# Patient Record
Sex: MALE | Race: OTHER | HISPANIC OR LATINO | URBAN - METROPOLITAN AREA
[De-identification: names, ages, dates, MRNs, and addresses within clinical notes are randomized per-mention and may not be internally consistent; named-entity substitution may affect disease eponyms.]

---

## 2024-07-21 ENCOUNTER — HOSPITAL ENCOUNTER (EMERGENCY)
Facility: HOSPITAL | Age: 29
Discharge: HOME | End: 2024-07-21
Attending: EMERGENCY MEDICINE

## 2024-07-21 VITALS
DIASTOLIC BLOOD PRESSURE: 80 MMHG | WEIGHT: 160 LBS | OXYGEN SATURATION: 97 % | SYSTOLIC BLOOD PRESSURE: 132 MMHG | TEMPERATURE: 98 F | HEIGHT: 69 IN | RESPIRATION RATE: 18 BRPM | BODY MASS INDEX: 23.7 KG/M2 | HEART RATE: 99 BPM

## 2024-07-21 DIAGNOSIS — R10.9 ABDOMINAL PAIN, UNSPECIFIED ABDOMINAL LOCATION: Primary | ICD-10-CM

## 2024-07-21 DIAGNOSIS — K44.9 HIATAL HERNIA: ICD-10-CM

## 2024-07-21 LAB
ALBUMIN SERPL BCP-MCNC: 5.2 G/DL (ref 3.5–5.2)
ALP SERPL-CCNC: 84 U/L (ref 55–135)
ALT SERPL W/O P-5'-P-CCNC: 40 U/L (ref 10–44)
AMPHET+METHAMPHET UR QL: NEGATIVE
ANION GAP SERPL CALC-SCNC: 21 MMOL/L (ref 8–16)
AST SERPL-CCNC: 17 U/L (ref 10–40)
BACTERIA #/AREA URNS HPF: ABNORMAL /HPF
BARBITURATES UR QL SCN>200 NG/ML: NEGATIVE
BASOPHILS # BLD AUTO: 0.03 K/UL (ref 0–0.2)
BASOPHILS # BLD AUTO: 0.04 K/UL (ref 0–0.2)
BASOPHILS NFR BLD: 0.2 % (ref 0–1.9)
BASOPHILS NFR BLD: 0.2 % (ref 0–1.9)
BENZODIAZ UR QL SCN>200 NG/ML: NEGATIVE
BILIRUB SERPL-MCNC: 0.9 MG/DL (ref 0.1–1)
BILIRUB UR QL STRIP: NEGATIVE
BUN SERPL-MCNC: 18 MG/DL (ref 6–20)
BZE UR QL SCN: NEGATIVE
CALCIUM SERPL-MCNC: 10.2 MG/DL (ref 8.7–10.5)
CANNABINOIDS UR QL SCN: NEGATIVE
CHLORIDE SERPL-SCNC: 104 MMOL/L (ref 95–110)
CLARITY UR: CLEAR
CO2 SERPL-SCNC: 16 MMOL/L (ref 23–29)
COLOR UR: YELLOW
CREAT SERPL-MCNC: 1.2 MG/DL (ref 0.5–1.4)
CREAT UR-MCNC: 240.5 MG/DL (ref 23–375)
DIFFERENTIAL METHOD BLD: ABNORMAL
DIFFERENTIAL METHOD BLD: ABNORMAL
EOSINOPHIL # BLD AUTO: 0 K/UL (ref 0–0.5)
EOSINOPHIL # BLD AUTO: 0 K/UL (ref 0–0.5)
EOSINOPHIL NFR BLD: 0 % (ref 0–8)
EOSINOPHIL NFR BLD: 0.1 % (ref 0–8)
ERYTHROCYTE [DISTWIDTH] IN BLOOD BY AUTOMATED COUNT: 11.5 % (ref 11.5–14.5)
ERYTHROCYTE [DISTWIDTH] IN BLOOD BY AUTOMATED COUNT: 11.6 % (ref 11.5–14.5)
EST. GFR  (NO RACE VARIABLE): >60 ML/MIN/1.73 M^2
GLUCOSE SERPL-MCNC: 142 MG/DL (ref 70–110)
GLUCOSE UR QL STRIP: NEGATIVE
HCT VFR BLD AUTO: 47.1 % (ref 40–54)
HCT VFR BLD AUTO: 49.2 % (ref 40–54)
HGB BLD-MCNC: 17 G/DL (ref 14–18)
HGB BLD-MCNC: 18.1 G/DL (ref 14–18)
HGB UR QL STRIP: NEGATIVE
HYALINE CASTS #/AREA URNS LPF: 0 /LPF
IMM GRANULOCYTES # BLD AUTO: 0.06 K/UL (ref 0–0.04)
IMM GRANULOCYTES # BLD AUTO: 0.06 K/UL (ref 0–0.04)
IMM GRANULOCYTES NFR BLD AUTO: 0.3 % (ref 0–0.5)
IMM GRANULOCYTES NFR BLD AUTO: 0.3 % (ref 0–0.5)
INFLUENZA A, MOLECULAR: NEGATIVE
INFLUENZA B, MOLECULAR: NEGATIVE
KETONES UR QL STRIP: ABNORMAL
LACTATE SERPL-SCNC: 1.8 MMOL/L (ref 0.5–2.2)
LACTATE SERPL-SCNC: 2.8 MMOL/L (ref 0.5–2.2)
LEUKOCYTE ESTERASE UR QL STRIP: NEGATIVE
LIPASE SERPL-CCNC: 28 U/L (ref 4–60)
LYMPHOCYTES # BLD AUTO: 0.6 K/UL (ref 1–4.8)
LYMPHOCYTES # BLD AUTO: 0.6 K/UL (ref 1–4.8)
LYMPHOCYTES NFR BLD: 3.5 % (ref 18–48)
LYMPHOCYTES NFR BLD: 3.5 % (ref 18–48)
MCH RBC QN AUTO: 30.9 PG (ref 27–31)
MCH RBC QN AUTO: 31.2 PG (ref 27–31)
MCHC RBC AUTO-ENTMCNC: 36.1 G/DL (ref 32–36)
MCHC RBC AUTO-ENTMCNC: 36.8 G/DL (ref 32–36)
MCV RBC AUTO: 85 FL (ref 82–98)
MCV RBC AUTO: 86 FL (ref 82–98)
METHADONE UR QL SCN>300 NG/ML: NEGATIVE
MICROSCOPIC COMMENT: ABNORMAL
MONOCYTES # BLD AUTO: 0.4 K/UL (ref 0.3–1)
MONOCYTES # BLD AUTO: 0.5 K/UL (ref 0.3–1)
MONOCYTES NFR BLD: 2.1 % (ref 4–15)
MONOCYTES NFR BLD: 2.5 % (ref 4–15)
NEUTROPHILS # BLD AUTO: 16.2 K/UL (ref 1.8–7.7)
NEUTROPHILS # BLD AUTO: 16.6 K/UL (ref 1.8–7.7)
NEUTROPHILS NFR BLD: 93.4 % (ref 38–73)
NEUTROPHILS NFR BLD: 93.9 % (ref 38–73)
NITRITE UR QL STRIP: NEGATIVE
NRBC BLD-RTO: 0 /100 WBC
NRBC BLD-RTO: 0 /100 WBC
OPIATES UR QL SCN: NEGATIVE
PCP UR QL SCN>25 NG/ML: NEGATIVE
PH UR STRIP: 7 [PH] (ref 5–8)
PLATELET # BLD AUTO: 324 K/UL (ref 150–450)
PLATELET # BLD AUTO: 347 K/UL (ref 150–450)
PMV BLD AUTO: 10.2 FL (ref 9.2–12.9)
PMV BLD AUTO: 10.2 FL (ref 9.2–12.9)
POTASSIUM SERPL-SCNC: 3.5 MMOL/L (ref 3.5–5.1)
PROT SERPL-MCNC: 8.5 G/DL (ref 6–8.4)
PROT UR QL STRIP: ABNORMAL
RBC # BLD AUTO: 5.5 M/UL (ref 4.6–6.2)
RBC # BLD AUTO: 5.8 M/UL (ref 4.6–6.2)
RBC #/AREA URNS HPF: 6 /HPF (ref 0–4)
SARS-COV-2 RDRP RESP QL NAA+PROBE: NEGATIVE
SODIUM SERPL-SCNC: 141 MMOL/L (ref 136–145)
SP GR UR STRIP: >1.03 (ref 1–1.03)
SPECIMEN SOURCE: NORMAL
SQUAMOUS #/AREA URNS HPF: 0 /HPF
TOXICOLOGY INFORMATION: NORMAL
URN SPEC COLLECT METH UR: ABNORMAL
UROBILINOGEN UR STRIP-ACNC: NEGATIVE EU/DL
WBC # BLD AUTO: 17.21 K/UL (ref 3.9–12.7)
WBC # BLD AUTO: 17.74 K/UL (ref 3.9–12.7)
WBC #/AREA URNS HPF: 1 /HPF (ref 0–5)

## 2024-07-21 PROCEDURE — 99285 EMERGENCY DEPT VISIT HI MDM: CPT | Mod: 25

## 2024-07-21 PROCEDURE — 87502 INFLUENZA DNA AMP PROBE: CPT | Performed by: EMERGENCY MEDICINE

## 2024-07-21 PROCEDURE — 83605 ASSAY OF LACTIC ACID: CPT | Performed by: EMERGENCY MEDICINE

## 2024-07-21 PROCEDURE — 96375 TX/PRO/DX INJ NEW DRUG ADDON: CPT

## 2024-07-21 PROCEDURE — 80307 DRUG TEST PRSMV CHEM ANLYZR: CPT | Performed by: EMERGENCY MEDICINE

## 2024-07-21 PROCEDURE — U0002 COVID-19 LAB TEST NON-CDC: HCPCS | Performed by: EMERGENCY MEDICINE

## 2024-07-21 PROCEDURE — 85025 COMPLETE CBC W/AUTO DIFF WBC: CPT | Performed by: EMERGENCY MEDICINE

## 2024-07-21 PROCEDURE — 96361 HYDRATE IV INFUSION ADD-ON: CPT

## 2024-07-21 PROCEDURE — 63600175 PHARM REV CODE 636 W HCPCS: Performed by: EMERGENCY MEDICINE

## 2024-07-21 PROCEDURE — 81000 URINALYSIS NONAUTO W/SCOPE: CPT | Mod: 59 | Performed by: EMERGENCY MEDICINE

## 2024-07-21 PROCEDURE — 82962 GLUCOSE BLOOD TEST: CPT

## 2024-07-21 PROCEDURE — 83690 ASSAY OF LIPASE: CPT | Performed by: EMERGENCY MEDICINE

## 2024-07-21 PROCEDURE — 80053 COMPREHEN METABOLIC PANEL: CPT | Performed by: EMERGENCY MEDICINE

## 2024-07-21 PROCEDURE — 25500020 PHARM REV CODE 255: Performed by: EMERGENCY MEDICINE

## 2024-07-21 PROCEDURE — 96365 THER/PROPH/DIAG IV INF INIT: CPT

## 2024-07-21 PROCEDURE — 25000003 PHARM REV CODE 250: Performed by: EMERGENCY MEDICINE

## 2024-07-21 RX ORDER — DICYCLOMINE HYDROCHLORIDE 20 MG/1
20 TABLET ORAL 2 TIMES DAILY PRN
Qty: 20 TABLET | Refills: 0 | Status: SHIPPED | OUTPATIENT
Start: 2024-07-21 | End: 2024-08-20

## 2024-07-21 RX ORDER — ONDANSETRON 4 MG/1
4 TABLET, ORALLY DISINTEGRATING ORAL EVERY 8 HOURS PRN
Qty: 12 TABLET | Refills: 0 | Status: SHIPPED | OUTPATIENT
Start: 2024-07-21 | End: 2024-07-24

## 2024-07-21 RX ORDER — ONDANSETRON HYDROCHLORIDE 2 MG/ML
4 INJECTION, SOLUTION INTRAVENOUS
Status: COMPLETED | OUTPATIENT
Start: 2024-07-21 | End: 2024-07-21

## 2024-07-21 RX ORDER — FENTANYL CITRATE 50 UG/ML
50 INJECTION, SOLUTION INTRAMUSCULAR; INTRAVENOUS
Status: COMPLETED | OUTPATIENT
Start: 2024-07-21 | End: 2024-07-21

## 2024-07-21 RX ADMIN — SODIUM CHLORIDE 1000 ML: 0.9 INJECTION, SOLUTION INTRAVENOUS at 04:07

## 2024-07-21 RX ADMIN — IOHEXOL 75 ML: 350 INJECTION, SOLUTION INTRAVENOUS at 07:07

## 2024-07-21 RX ADMIN — FENTANYL CITRATE 50 MCG: 50 INJECTION INTRAMUSCULAR; INTRAVENOUS at 04:07

## 2024-07-21 RX ADMIN — PROMETHAZINE HYDROCHLORIDE 25 MG: 25 INJECTION INTRAMUSCULAR; INTRAVENOUS at 04:07

## 2024-07-21 RX ADMIN — ONDANSETRON 4 MG: 2 INJECTION, SOLUTION INTRAMUSCULAR; INTRAVENOUS at 04:07

## 2024-07-21 NOTE — ED NOTES
Patient sleeping resting comfortably in bed. Breathing unlabored and even. Friend at bedside. Updated on plan of care.

## 2024-07-21 NOTE — ED NOTES
Patient falling back asleep. Breathing unlabored and even. Maintaining oxygen saturations on RA.   Side rails up x 2. Call light within reach. Friend at bedside. Updated on plan of care.

## 2024-07-21 NOTE — ED PROVIDER NOTES
Encounter Date: 7/21/2024       History     Chief Complaint   Patient presents with    Abdominal Pain     28 y.o. male to ED with c.o. severe abdominal pain with associated nausea and vomiting that started 2 days ago with progressively worsening symptoms. Patient endorses chills, and subjective fever at home. Patient endorses blood in the vomit. Patient denies diarrhea. Patient states he has not been able to keep anything down for the last two days including water?     Patient is a 28-year-old male with no significant past medical or surgical history who presents to the ED with complaint of abdominal pain.  Patient reports epigastric right lower quadrant abdominal pain described as stabbing and nonradiating that began approximately 2 days ago has gradually worsened.  Does report nausea with associated nonbilious nonbloody emesis.  He denies any diarrhea fevers chills chest pain or shortness of breath.  Denies any suspicious food intake or recent national travel.  He has not taken anything for his symptoms. He states his last BW as approximately 2 days ago and was unremarkable.       Review of patient's allergies indicates:   Allergen Reactions    Opioids - morphine analogues Palpitations     No past medical history on file.  No past surgical history on file.  No family history on file.     Review of Systems   Constitutional:  Negative for chills, fatigue and fever.   Respiratory:  Negative for cough and shortness of breath.    Gastrointestinal:  Positive for abdominal pain, nausea and vomiting. Negative for blood in stool, constipation and diarrhea.   Neurological:  Negative for dizziness and headaches.   Psychiatric/Behavioral:  Negative for agitation and confusion.        Physical Exam     Initial Vitals [07/21/24 1542]   BP Pulse Resp Temp SpO2   (!) 155/85 81 16 98.3 °F (36.8 °C) 99 %      MAP       --         Physical Exam    Nursing note and vitals reviewed.  Constitutional: He appears well-developed and  well-nourished.   Uncomfortable appearing.   Non toxic.  Not septic appearing.    Eyes: EOM are normal. Pupils are equal, round, and reactive to light.   Neck: Neck supple.   Normal range of motion.  Pulmonary/Chest: Breath sounds normal.   Abdominal: Abdomen is soft. Bowel sounds are normal.   Soft abdomen.  Diffusely tender.  No rebound tenderness.   No guarding.   Normal bowel sounds.    Musculoskeletal:      Cervical back: Normal range of motion and neck supple.     Neurological: He is alert and oriented to person, place, and time. He has normal strength. GCS score is 15. GCS eye subscore is 4. GCS verbal subscore is 5. GCS motor subscore is 6.   Skin: Skin is warm. Capillary refill takes less than 2 seconds.   Psychiatric: He has a normal mood and affect.         ED Course   Procedures  Labs Reviewed   CBC W/ AUTO DIFFERENTIAL - Abnormal       Result Value    WBC 17.74 (*)     RBC 5.80      Hemoglobin 18.1 (*)     Hematocrit 49.2      MCV 85      MCH 31.2 (*)     MCHC 36.8 (*)     RDW 11.5      Platelets 347      MPV 10.2      Immature Granulocytes 0.3      Gran # (ANC) 16.6 (*)     Immature Grans (Abs) 0.06 (*)     Lymph # 0.6 (*)     Mono # 0.5      Eos # 0.0      Baso # 0.04      nRBC 0      Gran % 93.4 (*)     Lymph % 3.5 (*)     Mono % 2.5 (*)     Eosinophil % 0.1      Basophil % 0.2      Differential Method Automated     COMPREHENSIVE METABOLIC PANEL - Abnormal    Sodium 141      Potassium 3.5      Chloride 104      CO2 16 (*)     Glucose 142 (*)     BUN 18      Creatinine 1.2      Calcium 10.2      Total Protein 8.5 (*)     Albumin 5.2      Total Bilirubin 0.9      Alkaline Phosphatase 84      AST 17      ALT 40      eGFR >60      Anion Gap 21 (*)    LACTIC ACID, PLASMA - Abnormal    Lactate (Lactic Acid) 2.8 (*)    CBC W/ AUTO DIFFERENTIAL - Abnormal    WBC 17.21 (*)     RBC 5.50      Hemoglobin 17.0      Hematocrit 47.1      MCV 86      MCH 30.9      MCHC 36.1 (*)     RDW 11.6      Platelets 324       MPV 10.2      Immature Granulocytes 0.3      Gran # (ANC) 16.2 (*)     Immature Grans (Abs) 0.06 (*)     Lymph # 0.6 (*)     Mono # 0.4      Eos # 0.0      Baso # 0.03      nRBC 0      Gran % 93.9 (*)     Lymph % 3.5 (*)     Mono % 2.1 (*)     Eosinophil % 0.0      Basophil % 0.2      Differential Method Automated     URINALYSIS, REFLEX TO URINE CULTURE - Abnormal    Specimen UA Urine, Clean Catch      Color, UA Yellow      Appearance, UA Clear      pH, UA 7.0      Specific Gravity, UA >1.030 (*)     Protein, UA 1+ (*)     Glucose, UA Negative      Ketones, UA 3+ (*)     Bilirubin (UA) Negative      Occult Blood UA Negative      Nitrite, UA Negative      Urobilinogen, UA Negative      Leukocytes, UA Negative      Narrative:     Specimen Source->Urine   URINALYSIS MICROSCOPIC - Abnormal    RBC, UA 6 (*)     WBC, UA 1      Bacteria None      Squam Epithel, UA 0      Hyaline Casts, UA 0      Microscopic Comment SEE COMMENT      Narrative:     Specimen Source->Urine   INFLUENZA A & B BY MOLECULAR    Influenza A, Molecular Negative      Influenza B, Molecular Negative      Flu A & B Source NP     SARS-COV-2 RNA AMPLIFICATION, QUAL    SARS-CoV-2 RNA, Amplification, Qual Negative     LIPASE    Lipase 28     LACTIC ACID, PLASMA    Lactate (Lactic Acid) 1.8     DRUG SCREEN PANEL, URINE EMERGENCY    Benzodiazepines Negative      Methadone metabolites Negative      Cocaine (Metab.) Negative      Opiate Scrn, Ur Negative      Barbiturate Screen, Ur Negative      Amphetamine Screen, Ur Negative      THC Negative      Phencyclidine Negative      Creatinine, Urine 240.5      Toxicology Information SEE COMMENT      Narrative:     Specimen Source->Urine          Imaging Results              CT Abdomen Pelvis With IV Contrast NO Oral Contrast (Final result)  Result time 07/21/24 19:33:36      Final result by Lane Kim MD (07/21/24 19:33:36)                   Impression:      Hiatal hernia.    Otherwise negative CT of the  abdomen and pelvis.      Electronically signed by: Lane Kim  Date:    07/21/2024  Time:    19:33               Narrative:    EXAMINATION:  CT ABDOMEN PELVIS WITH IV CONTRAST    CLINICAL HISTORY:  Abdominal pain, acute, nonlocalized;    TECHNIQUE:  Low dose axial images, sagittal and coronal reformations were obtained from the lung bases to the pubic symphysis following the IV administration of 75 mL of Omnipaque 350 no    COMPARISON:  None    FINDINGS:  Heart: Normal in size. No pericardial effusion.    Lung Bases: Well aerated, without consolidation or pleural fluid.    Liver: Normal in size and attenuation, with no focal hepatic lesions.    Gallbladder: No calcified gallstones.    Bile Ducts: No evidence of dilated ducts.    Pancreas: No mass or peripancreatic fat stranding.    Spleen: Unremarkable.    Adrenals: Unremarkable.    Kidneys/ Ureters: Unremarkable.    Bladder: No evidence of wall thickening.    Reproductive organs: Unremarkable.    GI Tract/Mesentery: Small to moderate hiatal hernia.  No evidence of bowel obstruction or inflammation.  Normal appendix.    Peritoneal Space: No ascites. No free air.    Retroperitoneum: No significant adenopathy.    Abdominal wall: Unremarkable.    Vasculature: No significant atherosclerosis or aneurysm.    Bones: No acute fracture.                                       Medications   sodium chloride 0.9% bolus 1,000 mL 1,000 mL (0 mLs Intravenous Stopped 7/21/24 1737)   ondansetron injection 4 mg (4 mg Intravenous Given 7/21/24 1637)   fentaNYL 50 mcg/mL injection 50 mcg (50 mcg Intravenous Given 7/21/24 1652)   promethazine (PHENERGAN) 25 mg in 0.9% NaCl 50 mL IVPB (0 mg Intravenous Stopped 7/21/24 1711)   iohexoL (OMNIPAQUE 350) injection 75 mL (75 mLs Intravenous Given 7/21/24 1904)     Medical Decision Making  Amount and/or Complexity of Data Reviewed  Labs: ordered. Decision-making details documented in ED Course.  Radiology: ordered. Decision-making details  documented in ED Course.    Risk  Prescription drug management.               ED Course as of 07/21/24 2345   Sun Jul 21, 2024   1706 WBC(!): 17.74 [LC]   1706 Hemoglobin(!): 18.1 [LC]   1706 Hematocrit: 49.2 [LC]   1714 Creatinine: 1.2 [LC]   1714 Calcium: 10.2 [LC]   1714 Sodium: 141 [LC]   1714 Lipase: 28 [LC]   1818 Lactic Acid Level(!): 2.8 [LC]   1818 Lipase: 28 [LC]   1818 Influenza A, Molecular: Negative [LC]   1818 Influenza B, Molecular: Negative [LC]   1818 SARS-CoV-2 RNA, Amplification, Qual: Negative  Pt reports improvement s/p Fentanyl adminstration  [LC]   1951 CT Abdomen Pelvis With IV Contrast NO Oral Contrast  No acute intra-abdominal abnormality per final radiology read.  [LC]   2045 Lactic Acid Level: 1.8 [LC]   2153 Ketones, UA(!): 3+ [LC]   2153 Marijuana (THC) Metabolite: Negative [LC]   2153 Opiates, Urine: Negative [LC]   2153 Cocaine, Urine: Negative [LC]   2153 Methadone metabolites: Negative [LC]   2153 Benzodiazepines: Negative [LC]   2153 RBC, UA(!): 6 [LC]   2311 WBC(!): 17.21 [LC]   2311 Lactic Acid Level: 1.8  Repeat lactic acid WNL.   [LC]   2311 Pt able to tolerate PO without difficulty. He reports his pain abdominal pain has completely resolved.  [LC]   2332 Pt's leukocytosis may be reactive to his pain, but in light of his normal CT A/P with IV  [LC]      ED Course User Index  [LC] Azar Carolina MD                 Medical Decision Making:   Initial Assessment:   See HPI   Clinical Tests:   Lab Tests: Reviewed and Ordered  Radiological Study: Ordered and Reviewed  ED Management:  - VSS; pt afebrile; mild distress initially  - patient administered IV Fentanyl, Zofran, Phenergan with cessation of abdominal pain, nausea and vomiting  - laboratory analysis notable for WBC of 17K; remainder of labs notable for mildly elevated lactic acid (prior to fluid infusion) but otherwise unremarkable lab analysis   - repeat lactic acid improved s/p administration of IVF   - CT A/P with IV  contrast demonstrated a hiatal hernia but otherwise no acute intra-abdominal or intra-pelvic abnormality  - pt observed for a few hours in the ED without return of his abdominal pain, nausea and/or vomiting  - pt able to tolerate PO without difficulty  - no further emergent intervention indicated at this time; pt requesting discharge   - will have pt f/u with general surgery as an OP in light of reported  hiatal hernia on CT scan imaging  - pt comfortable with plan for discharge with rx  for Bentyl and Zofran  - pt stable for discharge from the emergency department  - No further intervention is indicated at this time after having taken into account the patient's history, physical exam findings, and empirical and objective data obtained during the patient's emergency department workup.   - The patient is at low risk for an emergent medical condition at this time, and I am of the belief that that it is safe to discharge the patient from the emergency department.   - The patient is instructed to follow up as outpatient as indicated on the discharge paperwork.    - I have discussed the specifics of the workup with the patient and the patient has verbalized understanding of the details of the workup, the diagnosis, the treatment plan, and the need for outpatient follow-up.    - Although the patient has no emergent etiology today this does not preclude the development of an emergent condition so, in addition, I have advised the patient that they can return to the ED and/or activate EMS at any time with worsening of their symptoms, change of their symptoms, or with any other medical complaint.    - The patient remained comfortable and stable during their visit in the ED.    - Discharge and follow-up instructions discussed with the patient who expressed understanding and willingness to comply with my recommendations.  - Results of all emergency department tests  discussed thoroughly with patient; all patient questions  answered; pt in agreement with plan  - Pt instructed to follow up with PCP in 2-3 days for recheck of today's complaints  - Pt given strict emergency department return precautions for any new or worsening of symptoms  - Pt discharged from the emergency department in stable condition, in no acute distress                Clinical Impression:  Final diagnoses:  [R10.9] Abdominal pain, unspecified abdominal location (Primary)  [K44.9] Hiatal hernia          ED Disposition Condition    Discharge Stable          ED Prescriptions       Medication Sig Dispense Start Date End Date Auth. Provider    ondansetron (ZOFRAN-ODT) 4 MG TbDL Take 1 tablet (4 mg total) by mouth every 8 (eight) hours as needed (nausea). 12 tablet 7/21/2024 7/24/2024 Azar Carolina MD    dicyclomine (BENTYL) 20 mg tablet Take 1 tablet (20 mg total) by mouth 2 (two) times daily as needed (abdominal cramping). 20 tablet 7/21/2024 8/20/2024 Azar Carolina MD          Follow-up Information       Follow up With Specialties Details Why Contact Info    Yuan Ray MD Surgery, General Surgery Schedule an appointment as soon as possible for a visit   [address]  [address]  [facility] [id number] [phone]               Azar Carolina MD  07/21/24 1254

## 2024-07-21 NOTE — ED NOTES
Patient somnolent after fentanyl administration. Patient arouses to gentle stimuli. Oxygen placed on patient. MD notified. No additional orders.

## 2024-07-21 NOTE — ED NOTES
MD notified about oxygen being placed on patient. No additional orders received. Patient resting comfortably in bed. Breathing unlabored and even. No active vomiting. Pain and nausea improved. Skin warm. Covered with light blanket. Not diaphoretic. Side rails up x 2. Call light within reach. Updated on plan of care.

## 2024-07-21 NOTE — ED NOTES
Patient woke up vomiting. MD notified. MD to bedside.   Patient still does not feel urge to void at this time.

## 2024-07-21 NOTE — ED NOTES
RN spoke with patient, reports patient throwing up and not feeling well. MD to come to bedside. Orders received.

## 2024-07-21 NOTE — ED NOTES
Patient vomiting and dry heaving. Emesis color brown. No blood streaks noted. MD notified. Orders received.

## 2024-07-21 NOTE — ED NOTES
utilized for translation. ID [id number].    Patient arrived to ED via private vehicle with complaints of nausea, vomiting x 3 days with diffuse abdominal pain and now seeing red blood in emesis. Reports recent travel from [place]. Denies travel out of the country. Reports subjective fever. Denies HA, cough, chills, nasal congestion, sore throat.   Reports unable to tolerate PO. Active vomiting during assessment. Patient reports 10/10 pain. Skin hot  to touch. Did not take anything for symptoms. Denies sick contact.     APPEARANCE: Alert, oriented, malaise.   CARDIAC: Normal rate and rhythm, no murmur heard.   PERIPHERAL VASCULAR: peripheral pulses present. Normal cap refill. No edema. Warm to touch.    RESPIRATORY:Normal rate and effort, breath sounds clear bilaterally throughout chest. Respirations are equal and unlabored no obvious signs of distress.  GASTRO: soft, diffuse abdominal tenderness. Worse around epigastric and periumbilical. Active vomiting. Unknown last BM. Denies previous abdominal surgeries.   MUSC: Full ROM. No bony tenderness or soft tissue tenderness. No obvious deformity.  SKIN: Skin is hot to touch and dry, normal skin turgor, mucous membranes moist.  NEURO: 5/5 strength major flexors/extensors bilaterally. Sensory intact to light touch bilaterally. [place] coma scale: eyes open spontaneously-4, oriented & converses-5, obeys commands-6. No neurological abnormalities.   MENTAL STATUS: awake, alert and aware of environment.  EYE: PERRL, both eyes: pupils brisk and reactive to light. Normal size.  ENT: EARS: no obvious drainage. NOSE: no active bleeding.

## 2024-07-22 LAB — POCT GLUCOSE: 126 MG/DL (ref 70–110)

## 2024-07-22 NOTE — DISCHARGE INSTRUCTIONS
Christianson tomografía computarizada de christianson abdomen y pelvis no demuestra nada anormal aparte de amish hernia de hiato.    Por favor manténgase bindu hidratado.     Le darán el ilya con analgésicos. Tómelo según sea necesario.     Comuníquese con christianson médico de atención primaria en los próximos 2 o 3 días para volver a verificar las quejas de hoy.

## 2024-08-18 ENCOUNTER — HOSPITAL ENCOUNTER (EMERGENCY)
Facility: HOSPITAL | Age: 29
Discharge: HOME | End: 2024-08-19
Attending: EMERGENCY MEDICINE

## 2024-08-18 DIAGNOSIS — R11.2 NAUSEA AND VOMITING, UNSPECIFIED VOMITING TYPE: ICD-10-CM

## 2024-08-18 DIAGNOSIS — R03.0 ELEVATED BLOOD PRESSURE READING: ICD-10-CM

## 2024-08-18 DIAGNOSIS — K29.70 GASTRITIS, PRESENCE OF BLEEDING UNSPECIFIED, UNSPECIFIED CHRONICITY, UNSPECIFIED GASTRITIS TYPE: Primary | ICD-10-CM

## 2024-08-18 PROCEDURE — 99284 EMERGENCY DEPT VISIT MOD MDM: CPT | Mod: 25

## 2024-08-18 RX ORDER — LIDOCAINE HYDROCHLORIDE 20 MG/ML
15 SOLUTION OROPHARYNGEAL ONCE
Status: COMPLETED | OUTPATIENT
Start: 2024-08-19 | End: 2024-08-19

## 2024-08-18 RX ORDER — FAMOTIDINE 10 MG/ML
20 INJECTION INTRAVENOUS ONCE
Status: DISCONTINUED | OUTPATIENT
Start: 2024-08-19 | End: 2024-08-19 | Stop reason: HOSPADM

## 2024-08-18 RX ORDER — ALUMINUM HYDROXIDE, MAGNESIUM HYDROXIDE, AND SIMETHICONE 1200; 120; 1200 MG/30ML; MG/30ML; MG/30ML
30 SUSPENSION ORAL ONCE
Status: COMPLETED | OUTPATIENT
Start: 2024-08-19 | End: 2024-08-19

## 2024-08-19 VITALS
RESPIRATION RATE: 16 BRPM | OXYGEN SATURATION: 96 % | WEIGHT: 160 LBS | DIASTOLIC BLOOD PRESSURE: 82 MMHG | SYSTOLIC BLOOD PRESSURE: 134 MMHG | BODY MASS INDEX: 24.25 KG/M2 | TEMPERATURE: 98 F | HEIGHT: 68 IN | HEART RATE: 72 BPM

## 2024-08-19 LAB
ALBUMIN SERPL BCP-MCNC: 4.6 G/DL (ref 3.5–5.2)
ALP SERPL-CCNC: 77 U/L (ref 55–135)
ALT SERPL W/O P-5'-P-CCNC: 35 U/L (ref 10–44)
ANION GAP SERPL CALC-SCNC: 15 MMOL/L (ref 8–16)
AST SERPL-CCNC: 15 U/L (ref 10–40)
BACTERIA #/AREA URNS HPF: ABNORMAL /HPF
BASOPHILS # BLD AUTO: 0.03 K/UL (ref 0–0.2)
BASOPHILS NFR BLD: 0.3 % (ref 0–1.9)
BILIRUB SERPL-MCNC: 0.8 MG/DL (ref 0.1–1)
BILIRUB UR QL STRIP: NEGATIVE
BUN SERPL-MCNC: 16 MG/DL (ref 6–20)
CALCIUM SERPL-MCNC: 10 MG/DL (ref 8.7–10.5)
CHLORIDE SERPL-SCNC: 101 MMOL/L (ref 95–110)
CLARITY UR: CLEAR
CO2 SERPL-SCNC: 21 MMOL/L (ref 23–29)
COLOR UR: YELLOW
CREAT SERPL-MCNC: 0.9 MG/DL (ref 0.5–1.4)
DIFFERENTIAL METHOD BLD: ABNORMAL
EOSINOPHIL # BLD AUTO: 0 K/UL (ref 0–0.5)
EOSINOPHIL NFR BLD: 0.1 % (ref 0–8)
ERYTHROCYTE [DISTWIDTH] IN BLOOD BY AUTOMATED COUNT: 11.9 % (ref 11.5–14.5)
EST. GFR  (NO RACE VARIABLE): >60 ML/MIN/1.73 M^2
GLUCOSE SERPL-MCNC: 91 MG/DL (ref 70–110)
GLUCOSE UR QL STRIP: NEGATIVE
HCT VFR BLD AUTO: 47.1 % (ref 40–54)
HGB BLD-MCNC: 16.9 G/DL (ref 14–18)
HGB UR QL STRIP: NEGATIVE
HYALINE CASTS #/AREA URNS LPF: 0 /LPF
IMM GRANULOCYTES # BLD AUTO: 0.01 K/UL (ref 0–0.04)
IMM GRANULOCYTES NFR BLD AUTO: 0.1 % (ref 0–0.5)
KETONES UR QL STRIP: ABNORMAL
LEUKOCYTE ESTERASE UR QL STRIP: NEGATIVE
LIPASE SERPL-CCNC: 16 U/L (ref 4–60)
LYMPHOCYTES # BLD AUTO: 1.5 K/UL (ref 1–4.8)
LYMPHOCYTES NFR BLD: 16.8 % (ref 18–48)
MCH RBC QN AUTO: 31 PG (ref 27–31)
MCHC RBC AUTO-ENTMCNC: 35.9 G/DL (ref 32–36)
MCV RBC AUTO: 86 FL (ref 82–98)
MICROSCOPIC COMMENT: ABNORMAL
MONOCYTES # BLD AUTO: 0.6 K/UL (ref 0.3–1)
MONOCYTES NFR BLD: 6.1 % (ref 4–15)
NEUTROPHILS # BLD AUTO: 6.9 K/UL (ref 1.8–7.7)
NEUTROPHILS NFR BLD: 76.6 % (ref 38–73)
NITRITE UR QL STRIP: NEGATIVE
NRBC BLD-RTO: 0 /100 WBC
PH UR STRIP: 6 [PH] (ref 5–8)
PLATELET # BLD AUTO: 322 K/UL (ref 150–450)
PMV BLD AUTO: 9.7 FL (ref 9.2–12.9)
POTASSIUM SERPL-SCNC: 3.6 MMOL/L (ref 3.5–5.1)
PROT SERPL-MCNC: 7.9 G/DL (ref 6–8.4)
PROT UR QL STRIP: ABNORMAL
RBC # BLD AUTO: 5.46 M/UL (ref 4.6–6.2)
RBC #/AREA URNS HPF: 8 /HPF (ref 0–4)
SODIUM SERPL-SCNC: 137 MMOL/L (ref 136–145)
SP GR UR STRIP: >1.03 (ref 1–1.03)
SQUAMOUS #/AREA URNS HPF: 1 /HPF
URN SPEC COLLECT METH UR: ABNORMAL
UROBILINOGEN UR STRIP-ACNC: ABNORMAL EU/DL
WBC # BLD AUTO: 9.05 K/UL (ref 3.9–12.7)
WBC #/AREA URNS HPF: 3 /HPF (ref 0–5)

## 2024-08-19 PROCEDURE — 80053 COMPREHEN METABOLIC PANEL: CPT | Performed by: EMERGENCY MEDICINE

## 2024-08-19 PROCEDURE — 85025 COMPLETE CBC W/AUTO DIFF WBC: CPT | Performed by: EMERGENCY MEDICINE

## 2024-08-19 PROCEDURE — 63600175 PHARM REV CODE 636 W HCPCS: Performed by: EMERGENCY MEDICINE

## 2024-08-19 PROCEDURE — 81000 URINALYSIS NONAUTO W/SCOPE: CPT | Performed by: EMERGENCY MEDICINE

## 2024-08-19 PROCEDURE — 96375 TX/PRO/DX INJ NEW DRUG ADDON: CPT

## 2024-08-19 PROCEDURE — 25000003 PHARM REV CODE 250: Performed by: EMERGENCY MEDICINE

## 2024-08-19 PROCEDURE — 83690 ASSAY OF LIPASE: CPT | Performed by: EMERGENCY MEDICINE

## 2024-08-19 PROCEDURE — 96374 THER/PROPH/DIAG INJ IV PUSH: CPT

## 2024-08-19 RX ORDER — ONDANSETRON HYDROCHLORIDE 2 MG/ML
4 INJECTION, SOLUTION INTRAVENOUS
Status: COMPLETED | OUTPATIENT
Start: 2024-08-19 | End: 2024-08-19

## 2024-08-19 RX ORDER — PROMETHAZINE HYDROCHLORIDE 25 MG/1
25 TABLET ORAL EVERY 6 HOURS PRN
Qty: 20 TABLET | Refills: 0 | Status: SHIPPED | OUTPATIENT
Start: 2024-08-19 | End: 2024-09-08

## 2024-08-19 RX ORDER — HYOSCYAMINE SULFATE 0.12 MG/1
0.12 TABLET SUBLINGUAL EVERY 6 HOURS PRN
Qty: 30 TABLET | Refills: 0 | Status: SHIPPED | OUTPATIENT
Start: 2024-08-19 | End: 2024-08-24

## 2024-08-19 RX ORDER — KETOROLAC TROMETHAMINE 30 MG/ML
15 INJECTION, SOLUTION INTRAMUSCULAR; INTRAVENOUS
Status: COMPLETED | OUTPATIENT
Start: 2024-08-19 | End: 2024-08-19

## 2024-08-19 RX ORDER — PANTOPRAZOLE SODIUM 40 MG/1
40 TABLET, DELAYED RELEASE ORAL DAILY
Qty: 30 TABLET | Refills: 2 | Status: SHIPPED | OUTPATIENT
Start: 2024-08-19 | End: 2024-11-17

## 2024-08-19 RX ORDER — ONDANSETRON 4 MG/1
4 TABLET, ORALLY DISINTEGRATING ORAL EVERY 6 HOURS PRN
Qty: 20 TABLET | Refills: 0 | Status: SHIPPED | OUTPATIENT
Start: 2024-08-19 | End: 2024-08-19 | Stop reason: ALTCHOICE

## 2024-08-19 RX ADMIN — KETOROLAC TROMETHAMINE 15 MG: 30 INJECTION, SOLUTION INTRAMUSCULAR; INTRAVENOUS at 12:08

## 2024-08-19 RX ADMIN — LIDOCAINE HYDROCHLORIDE 15 ML: 20 SOLUTION ORAL at 12:08

## 2024-08-19 RX ADMIN — ONDANSETRON 4 MG: 2 INJECTION INTRAMUSCULAR; INTRAVENOUS at 12:08

## 2024-08-19 RX ADMIN — ALUMINUM HYDROXIDE, MAGNESIUM HYDROXIDE, AND SIMETHICONE 30 ML: 1200; 120; 1200 SUSPENSION ORAL at 12:08

## 2024-08-19 NOTE — ED PROVIDER NOTES
Chief Complaint:  Epigastric pain nausea and vomiting    History of Present Illness:    Oxana Nunez 28 y.o. with a  has no past medical history on file. who presents to the emergency department today with a complaint of epigastric pain nausea and vomiting for 5 days.  Patient reports he was constant pain.  He was vomiting up food and drink.  Can not keep anything down.  He denies any diarrhea.  Denies any fever, chills or sweats.  Did not take anything for the pain or the vomiting.  He had a similar episode like this in July.        ROS  Otherwise remaining ROS negative     The history is provided by the patient      Reviewed and verified by myself:   PMH/PSH/SOC/FH REVIEWED :    No past medical history on file.    No past surgical history on file.    Social History     Socioeconomic History    Marital status: Single       No family history on file.      ALLERGIES REVIEWED  Review of patient's allergies indicates:   Allergen Reactions    Opioids - morphine analogues Palpitations       MEDICATIONS REVIEWED  Medication List with Changes/Refills   New Medications    HYOSCYAMINE 0.125 MG SUBL    Place 1 tablet (0.125 mg total) under the tongue every 6 (six) hours as needed (abd pain).    PANTOPRAZOLE (PROTONIX) 40 MG TABLET    Take 1 tablet (40 mg total) by mouth once daily.    PROMETHAZINE (PHENERGAN) 25 MG TABLET    Take 1 tablet (25 mg total) by mouth every 6 (six) hours as needed for Nausea (or vomiting).   Current Medications    DICYCLOMINE (BENTYL) 20 MG TABLET    Take 1 tablet (20 mg total) by mouth 2 (two) times daily as needed (abdominal cramping).           VS reviewed    Nursing/Ancillary staff note reviewed.       Physical Exam     ED Triage Vitals [08/18/24 2248]   BP (!) 133/90   Pulse 90   Resp 14   Temp 98 °F (36.7 °C)   SpO2 96 %       Physical Exam    Constitutional: The patient is alert, has no immediate need for airway protection and no signs of toxicity. No acute distress. Lying in bed  but able to sit up without difficulty.   ENT: Mucous membranes are moist. Oropharynx clear.   Neck: Neck is supple non-tender. No lymphadenopathy. No stridor.   Respiratory: There are no retractions, lungs are clear to auscultation. No wheezing, no crackles.   Cardiovascular: Regular rate and rhythm. No murmurs, rubs or gallops.  Gastrointestinal:  Abdomen is soft and slight tenderness to palpation in the epigastric region, no masses, bowel sounds normal. No guarding, no rebound.  No pulsatile mass.   Neurological: Alert and oriented x 4. CN II-XII grossly intact. No focal weakness. Strength intact 5/5 bilaterally in upper and lower extremities.   Skin: Warm and dry, no rashes.  Musculoskeletal: Extremities are non-tender, non-swollen and have full range of motion. Back NTTP along the midline.           ED Course         ED Course as of 08/19/24 0149   Mon Aug 19, 2024   0102 CBC unremarkable, [JA]   0125 CMP unremarkable, lipase normal [JA]   0141 Leukocyte Esterase, UA: Negative [JA]   0141 NITRITE UA: Negative [JA]   0147 Patient reports that Zofran does not work for him.  I wrote him for some Phenergan instead.    Patient was feeling improved following medications.  Will discharge home with follow up with PCP. [JA]      ED Course User Index  [JA] Paola Velazquez MD              Medical Decision Making  Problems Addressed:  Elevated blood pressure reading: undiagnosed new problem with uncertain prognosis  Gastritis, presence of bleeding unspecified, unspecified chronicity, unspecified gastritis type: complicated acute illness or injury with systemic symptoms  Nausea and vomiting, unspecified vomiting type: complicated acute illness or injury with systemic symptoms    Amount and/or Complexity of Data Reviewed  External Data Reviewed: radiology and notes.     Details: Patient was seen for similar presentation on 07/21/2024 in the emergency department.  A review of the CT scan at that time shows that he had a  very small hiatal hernia but otherwise unremarkable CT scan.  Labs: ordered. Decision-making details documented in ED Course.    Risk  OTC drugs.  Prescription drug management.      Social determinants of health taken into consideration during development of our treatment plan include   Limited access to healthcare:  Uninsured  No PCP       Pt received the following in the ED:   Medications   famotidine (PF) injection 20 mg (has no administration in time range)   aluminum-magnesium hydroxide-simethicone 200-200-20 mg/5 mL suspension 30 mL (30 mLs Oral Given 8/19/24 0005)     And   LIDOcaine viscous HCl 2% oral solution 15 mL (15 mLs Oral Given 8/19/24 0005)   ketorolac injection 15 mg (15 mg Intravenous Given 8/19/24 0011)   ondansetron injection 4 mg (4 mg Intravenous Given 8/19/24 0010)     Will start :  New Prescriptions    HYOSCYAMINE 0.125 MG SUBL    Place 1 tablet (0.125 mg total) under the tongue every 6 (six) hours as needed (abd pain).    PANTOPRAZOLE (PROTONIX) 40 MG TABLET    Take 1 tablet (40 mg total) by mouth once daily.    PROMETHAZINE (PHENERGAN) 25 MG TABLET    Take 1 tablet (25 mg total) by mouth every 6 (six) hours as needed for Nausea (or vomiting).       OTC products such as tylenol or ibuprofen discussed for supplemental symptom control.       ED Management:  Differential diagnosis included but not limited to :  gastritis, gastroenteritis, pancreatitis, cholecystitis, ileus, small bowel obstruction, appendicitis.      Orders I ordered to further evaluate included:    Orders Placed This Encounter   Procedures    CBC auto differential    Comprehensive metabolic panel    Lipase    Urinalysis, Reflex to Urine Culture Urine, Clean Catch    Urinalysis Microscopic    Insert Saline lock IV       MDM continued:     Oxana Nunez  presents to the emergency Department today with an acute, complicated problem with systemic symptoms of  nausea vomiting and epigastric pain.    Workup today is  reassuring.   Labs were independently interpreted by myself    Their workup today does not show any signs of acute abnormality which hospitalization.  No sign of cholecystitis, pancreatitis by labs.  No sign of any hepatic abnormalities to cause his pain.  Unlikely to be an ileus or small bowel obstruction given the patient's presentation and physical exam.  The pt has remained afebrile here in the emergency department.  They are tolerating by mouth.  May possibly be gastritis.  I will place him on an acid blocker.  I have written for Phenergan and Levsin to help with the symptom control.  At this time I'll discharge home to follow up with primary care physician for further evaluation.  Blood pressure was elevated follow up with PCP.  He was given resource information for PCP.  The patient is comfortable with this plan and comfortable going home at this time.       After taking into careful account the historical factors and physical exam findings of the patient's presentation today, in conjunction with the empirical and objective data obtained on ED workup, no acute emergent medical condition has been identified. The patient appears to be low risk for an emergent medical condition and I feel it is safe and appropriate at this time for the patient to be discharged to follow-up as detailed in their discharge instructions for reevaluation and possible continued outpatient workup and management. I have discussed the specifics of the workup with the patient and the patient has verbalized understanding of the details of the workup, the diagnosis, the treatment plan, and the need for outpatient follow-up.  Although the patient has no emergent etiology today this does not preclude the development of an emergent condition so in addition, I have advised the patient that they can return to the ED and/or activate EMS at any time with worsening of their symptoms, change of their symptoms, or with any other medical complaint.   The patient remained comfortable and stable during their visit in the ED.  Discharge and follow-up instructions discussed with the patient who expressed understanding and willingness to comply with my recommendations.     This medical record was prepared using voice dictation software.       Impression      The primary encounter diagnosis was Gastritis, presence of bleeding unspecified, unspecified chronicity, unspecified gastritis type. Diagnoses of Nausea and vomiting, unspecified vomiting type and Elevated blood pressure reading were also pertinent to this visit.        New Prescriptions    HYOSCYAMINE 0.125 MG SUBL    Place 1 tablet (0.125 mg total) under the tongue every 6 (six) hours as needed (abd pain).    PANTOPRAZOLE (PROTONIX) 40 MG TABLET    Take 1 tablet (40 mg total) by mouth once daily.    PROMETHAZINE (PHENERGAN) 25 MG TABLET    Take 1 tablet (25 mg total) by mouth every 6 (six) hours as needed for Nausea (or vomiting).        Follow-up Information       Schedule an appointment as soon as possible for a visit  with [facility].    Specialties: Behavioral Health, Psychiatry  Contact information:  [address]  [place] [id number] [phone]                                          Paola Velazquez MD  08/19/24 0151

## 2024-08-19 NOTE — DISCHARGE INSTRUCTIONS
Additional instructions  Followup with your primary care physician. Take all your medications as prescribed. Return to the emergency department if you have increasing pain, chest pain, difficulty breathing,  nonstop vomiting, or any other concerns. Be sure to drink plenty of fluids to stay hydrated. Get plenty of rest. Please refer to additional educational material for further instructions.    Your blood pressure was over > 120/80 without history of hypertension you are advised to follow up with your PCP.  While elevated Blood pressures can be caused by many things including just coming to the emergency department, you will need to follow up with your primary care physician for further evaluation ideally in 2-3 days.

## 2024-09-21 ENCOUNTER — HOSPITAL ENCOUNTER (INPATIENT)
Facility: HOSPITAL | Age: 29
LOS: 1 days | Discharge: HOME | DRG: 377 | End: 2024-09-24
Attending: STUDENT IN AN ORGANIZED HEALTH CARE EDUCATION/TRAINING PROGRAM | Admitting: FAMILY MEDICINE

## 2024-09-21 DIAGNOSIS — K92.0 HEMATEMESIS: ICD-10-CM

## 2024-09-21 DIAGNOSIS — R07.9 CHEST PAIN: ICD-10-CM

## 2024-09-21 DIAGNOSIS — K20.90 ESOPHAGITIS DETERMINED BY ENDOSCOPY: Primary | ICD-10-CM

## 2024-09-21 DIAGNOSIS — R10.13 EPIGASTRIC PAIN: ICD-10-CM

## 2024-09-21 LAB
ALBUMIN SERPL BCP-MCNC: 4.8 G/DL (ref 3.5–5.2)
ALP SERPL-CCNC: 89 U/L (ref 55–135)
ALT SERPL W/O P-5'-P-CCNC: 31 U/L (ref 10–44)
AMPHET+METHAMPHET UR QL: NEGATIVE
ANION GAP SERPL CALC-SCNC: 15 MMOL/L (ref 8–16)
ANION GAP SERPL CALC-SCNC: 19 MMOL/L (ref 8–16)
AST SERPL-CCNC: 16 U/L (ref 10–40)
BACTERIA #/AREA URNS HPF: NORMAL /HPF
BARBITURATES UR QL SCN>200 NG/ML: NEGATIVE
BASOPHILS # BLD AUTO: 0.04 K/UL (ref 0–0.2)
BASOPHILS NFR BLD: 0.5 % (ref 0–1.9)
BENZODIAZ UR QL SCN>200 NG/ML: NEGATIVE
BILIRUB SERPL-MCNC: 0.8 MG/DL (ref 0.1–1)
BILIRUB UR QL STRIP: NEGATIVE
BUN SERPL-MCNC: 10 MG/DL (ref 6–20)
BUN SERPL-MCNC: 12 MG/DL (ref 6–20)
BZE UR QL SCN: NEGATIVE
CALCIUM SERPL-MCNC: 10.4 MG/DL (ref 8.7–10.5)
CALCIUM SERPL-MCNC: 9 MG/DL (ref 8.7–10.5)
CANNABINOIDS UR QL SCN: NEGATIVE
CHLORIDE SERPL-SCNC: 108 MMOL/L (ref 95–110)
CHLORIDE SERPL-SCNC: 111 MMOL/L (ref 95–110)
CLARITY UR: CLEAR
CO2 SERPL-SCNC: 15 MMOL/L (ref 23–29)
CO2 SERPL-SCNC: 15 MMOL/L (ref 23–29)
COLOR UR: YELLOW
CREAT SERPL-MCNC: 0.9 MG/DL (ref 0.5–1.4)
CREAT SERPL-MCNC: 1 MG/DL (ref 0.5–1.4)
CREAT UR-MCNC: >450 MG/DL (ref 23–375)
CTP QC/QA: YES
DIFFERENTIAL METHOD BLD: ABNORMAL
EOSINOPHIL # BLD AUTO: 0 K/UL (ref 0–0.5)
EOSINOPHIL NFR BLD: 0 % (ref 0–8)
ERYTHROCYTE [DISTWIDTH] IN BLOOD BY AUTOMATED COUNT: 11.9 % (ref 11.5–14.5)
EST. GFR  (NO RACE VARIABLE): >60 ML/MIN/1.73 M^2
EST. GFR  (NO RACE VARIABLE): >60 ML/MIN/1.73 M^2
GLUCOSE SERPL-MCNC: 117 MG/DL (ref 70–110)
GLUCOSE SERPL-MCNC: 147 MG/DL (ref 70–110)
GLUCOSE UR QL STRIP: NEGATIVE
HCT VFR BLD AUTO: 48.6 % (ref 40–54)
HGB BLD-MCNC: 17.5 G/DL (ref 14–18)
HGB UR QL STRIP: NEGATIVE
HYALINE CASTS #/AREA URNS LPF: NORMAL /LPF
IMM GRANULOCYTES # BLD AUTO: 0.03 K/UL (ref 0–0.04)
IMM GRANULOCYTES NFR BLD AUTO: 0.4 % (ref 0–0.5)
INFLUENZA A, MOLECULAR: NEGATIVE
INFLUENZA B, MOLECULAR: NEGATIVE
KETONES UR QL STRIP: ABNORMAL
LACTATE SERPL-SCNC: 2.2 MMOL/L (ref 0.5–2.2)
LACTATE SERPL-SCNC: 2.7 MMOL/L (ref 0.5–2.2)
LEUKOCYTE ESTERASE UR QL STRIP: NEGATIVE
LIPASE SERPL-CCNC: 20 U/L (ref 4–60)
LYMPHOCYTES # BLD AUTO: 1 K/UL (ref 1–4.8)
LYMPHOCYTES NFR BLD: 11.2 % (ref 18–48)
MCH RBC QN AUTO: 31.4 PG (ref 27–31)
MCHC RBC AUTO-ENTMCNC: 36 G/DL (ref 32–36)
MCV RBC AUTO: 87 FL (ref 82–98)
METHADONE UR QL SCN>300 NG/ML: NEGATIVE
MICROSCOPIC COMMENT: NORMAL
MONOCYTES # BLD AUTO: 0.2 K/UL (ref 0.3–1)
MONOCYTES NFR BLD: 2.6 % (ref 4–15)
NEUTROPHILS # BLD AUTO: 7.2 K/UL (ref 1.8–7.7)
NEUTROPHILS NFR BLD: 85.3 % (ref 38–73)
NITRITE UR QL STRIP: NEGATIVE
NRBC BLD-RTO: 0 /100 WBC
OB PNL GAST: POSITIVE
OPIATES UR QL SCN: NEGATIVE
PCP UR QL SCN>25 NG/ML: NEGATIVE
PH UR STRIP: 8 [PH] (ref 5–8)
PLATELET # BLD AUTO: 370 K/UL (ref 150–450)
PMV BLD AUTO: 10.3 FL (ref 9.2–12.9)
POTASSIUM SERPL-SCNC: 3.7 MMOL/L (ref 3.5–5.1)
POTASSIUM SERPL-SCNC: 3.9 MMOL/L (ref 3.5–5.1)
PROT SERPL-MCNC: 8.3 G/DL (ref 6–8.4)
PROT UR QL STRIP: ABNORMAL
RBC # BLD AUTO: 5.57 M/UL (ref 4.6–6.2)
RBC #/AREA URNS HPF: 4 /HPF (ref 0–4)
SARS-COV-2 RDRP RESP QL NAA+PROBE: NEGATIVE
SODIUM SERPL-SCNC: 141 MMOL/L (ref 136–145)
SODIUM SERPL-SCNC: 142 MMOL/L (ref 136–145)
SP GR UR STRIP: >1.03 (ref 1–1.03)
SPECIMEN SOURCE: NORMAL
TOXICOLOGY INFORMATION: ABNORMAL
URN SPEC COLLECT METH UR: ABNORMAL
UROBILINOGEN UR STRIP-ACNC: ABNORMAL EU/DL
WBC # BLD AUTO: 8.47 K/UL (ref 3.9–12.7)
WBC #/AREA URNS HPF: 2 /HPF (ref 0–5)

## 2024-09-21 PROCEDURE — 81000 URINALYSIS NONAUTO W/SCOPE: CPT | Mod: 59

## 2024-09-21 PROCEDURE — 63600175 PHARM REV CODE 636 W HCPCS

## 2024-09-21 PROCEDURE — 25500020 PHARM REV CODE 255

## 2024-09-21 PROCEDURE — 87635 SARS-COV-2 COVID-19 AMP PRB: CPT

## 2024-09-21 PROCEDURE — 80048 BASIC METABOLIC PNL TOTAL CA: CPT | Mod: XB

## 2024-09-21 PROCEDURE — G0378 HOSPITAL OBSERVATION PER HR: HCPCS

## 2024-09-21 PROCEDURE — 25000003 PHARM REV CODE 250

## 2024-09-21 PROCEDURE — 83605 ASSAY OF LACTIC ACID: CPT

## 2024-09-21 PROCEDURE — 87502 INFLUENZA DNA AMP PROBE: CPT | Performed by: FAMILY MEDICINE

## 2024-09-21 PROCEDURE — 82271 OCCULT BLOOD OTHER SOURCES: CPT

## 2024-09-21 PROCEDURE — 83690 ASSAY OF LIPASE: CPT

## 2024-09-21 PROCEDURE — 80053 COMPREHEN METABOLIC PANEL: CPT

## 2024-09-21 PROCEDURE — 80307 DRUG TEST PRSMV CHEM ANLYZR: CPT

## 2024-09-21 PROCEDURE — 85025 COMPLETE CBC W/AUTO DIFF WBC: CPT

## 2024-09-21 RX ORDER — METOCLOPRAMIDE HYDROCHLORIDE 5 MG/ML
10 INJECTION INTRAMUSCULAR; INTRAVENOUS
Status: DISCONTINUED | OUTPATIENT
Start: 2024-09-21 | End: 2024-09-21

## 2024-09-21 RX ORDER — DROPERIDOL 2.5 MG/ML
0.62 INJECTION, SOLUTION INTRAMUSCULAR; INTRAVENOUS
Status: COMPLETED | OUTPATIENT
Start: 2024-09-21 | End: 2024-09-21

## 2024-09-21 RX ORDER — ACETAMINOPHEN 500 MG
1000 TABLET ORAL EVERY 8 HOURS PRN
Status: DISCONTINUED | OUTPATIENT
Start: 2024-09-21 | End: 2024-09-24 | Stop reason: HOSPADM

## 2024-09-21 RX ORDER — IBUPROFEN 200 MG
24 TABLET ORAL
Status: DISCONTINUED | OUTPATIENT
Start: 2024-09-21 | End: 2024-09-24 | Stop reason: HOSPADM

## 2024-09-21 RX ORDER — SODIUM CHLORIDE 9 MG/ML
1000 INJECTION, SOLUTION INTRAVENOUS
Status: COMPLETED | OUTPATIENT
Start: 2024-09-21 | End: 2024-09-21

## 2024-09-21 RX ORDER — BISACODYL 10 MG/1
10 SUPPOSITORY RECTAL DAILY PRN
Status: DISCONTINUED | OUTPATIENT
Start: 2024-09-21 | End: 2024-09-24 | Stop reason: HOSPADM

## 2024-09-21 RX ORDER — ONDANSETRON HYDROCHLORIDE 2 MG/ML
4 INJECTION, SOLUTION INTRAVENOUS
Status: COMPLETED | OUTPATIENT
Start: 2024-09-21 | End: 2024-09-21

## 2024-09-21 RX ORDER — SODIUM CHLORIDE 0.9 % (FLUSH) 0.9 %
10 SYRINGE (ML) INJECTION EVERY 12 HOURS PRN
Status: DISCONTINUED | OUTPATIENT
Start: 2024-09-21 | End: 2024-09-24 | Stop reason: HOSPADM

## 2024-09-21 RX ORDER — KETOROLAC TROMETHAMINE 30 MG/ML
15 INJECTION, SOLUTION INTRAMUSCULAR; INTRAVENOUS
Status: COMPLETED | OUTPATIENT
Start: 2024-09-21 | End: 2024-09-21

## 2024-09-21 RX ORDER — NALOXONE HCL 0.4 MG/ML
0.02 VIAL (ML) INJECTION
Status: DISCONTINUED | OUTPATIENT
Start: 2024-09-21 | End: 2024-09-24 | Stop reason: HOSPADM

## 2024-09-21 RX ORDER — GLUCAGON 1 MG
1 KIT INJECTION
Status: DISCONTINUED | OUTPATIENT
Start: 2024-09-21 | End: 2024-09-24 | Stop reason: HOSPADM

## 2024-09-21 RX ORDER — IBUPROFEN 200 MG
16 TABLET ORAL
Status: DISCONTINUED | OUTPATIENT
Start: 2024-09-21 | End: 2024-09-24 | Stop reason: HOSPADM

## 2024-09-21 RX ORDER — PROCHLORPERAZINE EDISYLATE 5 MG/ML
5 INJECTION INTRAMUSCULAR; INTRAVENOUS EVERY 6 HOURS PRN
Status: DISCONTINUED | OUTPATIENT
Start: 2024-09-21 | End: 2024-09-24 | Stop reason: HOSPADM

## 2024-09-21 RX ADMIN — IOHEXOL 75 ML: 350 INJECTION, SOLUTION INTRAVENOUS at 06:09

## 2024-09-21 RX ADMIN — SODIUM CHLORIDE 1000 ML: 9 INJECTION, SOLUTION INTRAVENOUS at 06:09

## 2024-09-21 RX ADMIN — SODIUM CHLORIDE 1000 ML: 9 INJECTION, SOLUTION INTRAVENOUS at 04:09

## 2024-09-21 RX ADMIN — PROCHLORPERAZINE EDISYLATE 5 MG: 5 INJECTION INTRAMUSCULAR; INTRAVENOUS at 08:09

## 2024-09-21 RX ADMIN — ONDANSETRON 4 MG: 2 INJECTION INTRAMUSCULAR; INTRAVENOUS at 04:09

## 2024-09-21 RX ADMIN — DROPERIDOL 0.62 MG: 2.5 INJECTION, SOLUTION INTRAMUSCULAR; INTRAVENOUS at 06:09

## 2024-09-21 RX ADMIN — KETOROLAC TROMETHAMINE 15 MG: 30 INJECTION, SOLUTION INTRAMUSCULAR; INTRAVENOUS at 04:09

## 2024-09-21 NOTE — ED PROVIDER NOTES
Encounter Date: 9/21/2024       History     Chief Complaint   Patient presents with    Abdominal Pain     C/o upper ABD pain with N/V, bloody vomit and subjective fever x4 days. Denies other sx. No vomiting in triage.      Patient is a 29 y.o. male who presents to the ED for evaluation of epigastric abdominal pain, nausea, vomiting, constipation, fatigue, subjective fever, chills X 4 days. Reports that he has been seeing streaks of blood in his vomitus. Patient had a similar issue on 8/18/24 and 7/21/24 where he was seen and discharged both times from ED. Denies alcohol use or recreational drug use.     States that he has been having frequent bouts of vomiting and dry retching. Patient has taken no medication for symptom relief. Patient denies any previous abdominal surgeries.     States that he last bowel movement was 4 days ago. Patient denies BRBPR, denies melena, or changes in bowel habits. Patient  denies dysuria, difficulty urinating, hematuria, flank pain. Denies chest pain, shortness of breath, rashes, or any other complaints at this time.          The history is provided by the patient. The history is limited by a language barrier. A  was used ([employer] interpret used for all interactions.).     Review of patient's allergies indicates:   Allergen Reactions    Opioids - morphine analogues Palpitations     History reviewed. No pertinent past medical history.  History reviewed. No pertinent surgical history.  No family history on file.  Social History     Tobacco Use    Smoking status: Unknown     Review of Systems   Constitutional:  Positive for appetite change and fatigue. Negative for chills and fever.   HENT:  Negative for congestion, rhinorrhea and sore throat.    Respiratory:  Positive for cough. Negative for shortness of breath and wheezing.    Cardiovascular:  Negative for chest pain.   Gastrointestinal:  Positive for abdominal pain, constipation, nausea and vomiting (streaks of  blood in vomit). Negative for abdominal distention and diarrhea.   Genitourinary:  Negative for dysuria, flank pain, frequency and hematuria.   Musculoskeletal:  Negative for back pain, neck pain and neck stiffness.   Skin:  Negative for rash.   Neurological:  Negative for weakness and light-headedness.   Hematological:  Does not bruise/bleed easily.   All other systems reviewed and are negative.      Physical Exam     Initial Vitals [09/21/24 1609]   BP Pulse Resp Temp SpO2   (!) 133/90 90 (!) 24 98.1 °F (36.7 °C) 99 %      MAP       --         Physical Exam    Constitutional: He appears well-developed and well-nourished. He appears ill.   HENT:   Head: Normocephalic and atraumatic.   Cardiovascular:  Normal rate.           Abdominal: Abdomen is soft. He exhibits no distension. There is abdominal tenderness in the epigastric area.   No right CVA tenderness.  No left CVA tenderness. There is guarding. There is no rebound.     Neurological: He is alert.         ED Course   Procedures  Labs Reviewed   CBC W/ AUTO DIFFERENTIAL - Abnormal       Result Value    WBC 8.47      RBC 5.57      Hemoglobin 17.5      Hematocrit 48.6      MCV 87      MCH 31.4 (*)     MCHC 36.0      RDW 11.9      Platelets 370      MPV 10.3      Immature Granulocytes 0.4      Gran # (ANC) 7.2      Immature Grans (Abs) 0.03      Lymph # 1.0      Mono # 0.2 (*)     Eos # 0.0      Baso # 0.04      nRBC 0      Gran % 85.3 (*)     Lymph % 11.2 (*)     Mono % 2.6 (*)     Eosinophil % 0.0      Basophil % 0.5      Differential Method Automated     COMPREHENSIVE METABOLIC PANEL - Abnormal    Sodium 142      Potassium 3.7      Chloride 108      CO2 15 (*)     Glucose 147 (*)     BUN 12      Creatinine 1.0      Calcium 10.4      Total Protein 8.3      Albumin 4.8      Total Bilirubin 0.8      Alkaline Phosphatase 89      AST 16      ALT 31      eGFR >60      Anion Gap 19 (*)    URINALYSIS, REFLEX TO URINE CULTURE - Abnormal    Specimen UA Urine, Clean Catch       Color, UA Yellow      Appearance, UA Clear      pH, UA 8.0      Specific Gravity, UA >1.030 (*)     Protein, UA 1+ (*)     Glucose, UA Negative      Ketones, UA 3+ (*)     Bilirubin (UA) Negative      Occult Blood UA Negative      Nitrite, UA Negative      Urobilinogen, UA 2.0-3.0 (*)     Leukocytes, UA Negative      Narrative:     Specimen Source->Urine   LACTIC ACID, PLASMA - Abnormal    Lactate (Lactic Acid) 2.7 (*)    DRUG SCREEN PANEL, URINE EMERGENCY - Abnormal    Benzodiazepines Negative      Methadone metabolites Negative      Cocaine (Metab.) Negative      Opiate Scrn, Ur Negative      Barbiturate Screen, Ur Negative      Amphetamine Screen, Ur Negative      THC Negative      Phencyclidine Negative      Creatinine, Urine >450.0 (*)     Toxicology Information SEE COMMENT      Narrative:     Specimen Source->Urine   OCCULT BLOOD, OTHER SOURCES - Abnormal    Occult Blood Positive (*)    BASIC METABOLIC PANEL - Abnormal    Sodium 141      Potassium 3.9      Chloride 111 (*)     CO2 15 (*)     Glucose 117 (*)     BUN 10      Creatinine 0.9      Calcium 9.0      Anion Gap 15      eGFR >60     BASIC METABOLIC PANEL - Abnormal    Sodium 142      Potassium 3.2 (*)     Chloride 106      CO2 22 (*)     Glucose 86      BUN 11      Creatinine 0.8      Calcium 9.5      Anion Gap 14      eGFR >60     CBC W/ AUTO DIFFERENTIAL - Abnormal    WBC 11.80      RBC 5.05      Hemoglobin 15.7      Hematocrit 44.8      MCV 89      MCH 31.1 (*)     MCHC 35.0      RDW 12.2      Platelets 333      MPV 10.2      Immature Granulocytes 0.3      Gran # (ANC) 8.9 (*)     Immature Grans (Abs) 0.03      Lymph # 1.9      Mono # 1.0      Eos # 0.0      Baso # 0.03      nRBC 0      Gran % 74.9 (*)     Lymph % 16.0 (*)     Mono % 8.5      Eosinophil % 0.0      Basophil % 0.3      Differential Method Automated     INFLUENZA A & B BY MOLECULAR    Influenza A, Molecular Negative      Influenza B, Molecular Negative      Flu A & B Source Nasal  swab     LIPASE    Lipase 20     URINALYSIS MICROSCOPIC    RBC, UA 4      WBC, UA 2      Bacteria Rare      Hyaline Casts, UA None      Microscopic Comment SEE COMMENT      Narrative:     Specimen Source->Urine   LACTIC ACID, PLASMA    Lactate (Lactic Acid) 2.2     MAGNESIUM    Magnesium 2.1     PHOSPHORUS    Phosphorus 4.0     SARS-COV-2 RDRP GENE    POC Rapid COVID Negative       Acceptable Yes            Imaging Results              X-Ray Chest AP Portable (Final result)  Result time 09/21/24 18:37:40      Final result by Corey Ramirez DO (09/21/24 18:37:40)                   Impression:      No acute abnormality.      Electronically signed by: Corey Ramirez  Date:    09/21/2024  Time:    18:37               Narrative:    EXAMINATION:  XR CHEST AP PORTABLE    CLINICAL HISTORY:  Epigastric pain    TECHNIQUE:  Single frontal view of the chest was performed.    COMPARISON:  None    FINDINGS:  The lungs are well expanded and clear. No focal opacities are seen. The pleural spaces are clear. The cardiac silhouette is unremarkable. The visualized osseous structures are unremarkable.                                       CT Abdomen Pelvis With IV Contrast NO Oral Contrast (Final result)  Result time 09/21/24 18:18:37      Final result by Corey Ramirez DO (09/21/24 18:18:37)                   Impression:      No acute abnormality of the abdomen or pelvis.      Electronically signed by: Corey Ramirez  Date:    09/21/2024  Time:    18:18               Narrative:    EXAMINATION:  CT ABDOMEN PELVIS WITH IV CONTRAST    CLINICAL HISTORY:  Abdominal pain, acute, nonlocalized;    TECHNIQUE:  Axial CT images with sagittal and coronal reformats were obtained of the abdomen and pelvis from the hemidiaphragms through the symphysis pubis after the administration of 75mL Omnipaque 350.    COMPARISON:  CT of the abdomen and pelvis from 07/21/2024.    FINDINGS:  Lung Bases: Clear.    Heart: Heart size is normal.   No pericardial effusion.    Liver: The liver is normal in size and demonstrates homogeneous enhancement without focal lesion.  The portal vasculature is patent.    Biliary tract: No intrahepatic or extrahepatic biliary ductal dilatation.    Gallbladder: No radiodense gallstone. No wall thickening or pericholecystic fluid.    Pancreas: Normal. No pancreatic ductal dilatation.    Spleen: Normal size without focal lesion.    Adrenals: Unremarkable.    Kidneys and urinary collecting systems: Normal.  No hydronephrosis or urolithiasis.    Lymph nodes: None enlarged.    Stomach and bowel: The stomach is normal.  There is a small hiatal hernia.  Loops of small and large bowel are normal in caliber without evidence for inflammation or obstruction.  The appendix is normal.    Peritoneum and mesentery: No ascites or free intraperitoneal air.  No abdominal fluid collection.    Vasculature: No aneurysm or significant atherosclerosis.    Urinary bladder: No wall thickening.    Reproductive organs: The prostate is unremarkable.    Body wall: No abnormality.    Musculoskeletal: No aggressive osseous lesion.                                       Medications   sodium chloride 0.9% flush 10 mL (has no administration in time range)   naloxone 0.4 mg/mL injection 0.02 mg (has no administration in time range)   glucose chewable tablet 16 g (has no administration in time range)   glucose chewable tablet 24 g (has no administration in time range)   glucagon (human recombinant) injection 1 mg (has no administration in time range)   acetaminophen tablet 1,000 mg (has no administration in time range)   bisacodyL suppository 10 mg (has no administration in time range)   prochlorperazine injection Soln 5 mg (5 mg Intravenous Given 9/22/24 0304)   dextrose 10% bolus 125 mL 125 mL (has no administration in time range)   dextrose 10% bolus 250 mL 250 mL (has no administration in time range)   HYDROmorphone injection 0.2 mg (0.2 mg Intravenous  Given 9/22/24 0056)   diphenhydrAMINE injection 25 mg (has no administration in time range)   polyethylene glycol packet 17 g (17 g Oral Given 9/22/24 1045)   senna-docusate 8.6-50 mg per tablet 1 tablet (1 tablet Oral Given 9/22/24 1045)   ondansetron injection 4 mg (4 mg Intravenous Given 9/21/24 1657)   ketorolac injection 15 mg (15 mg Intravenous Given 9/21/24 1657)   sodium chloride 0.9% bolus 1,000 mL 1,000 mL (0 mLs Intravenous Stopped 9/21/24 1756)   iohexoL (OMNIPAQUE 350) injection 75 mL (75 mLs Intravenous Given 9/21/24 1805)   droPERidol injection 0.625 mg (0.625 mg Intravenous Given 9/21/24 1835)   0.9%  NaCl infusion (0 mLs Intravenous Stopped 9/21/24 1945)   potassium bicarbonate disintegrating tablet 25 mEq (25 mEq Oral Given 9/22/24 1238)     Medical Decision Making  Patient is an afebrile 29 y.o. male. VSS and do not suggest sepsis. A&Ox4. The patient remained comfortable and stable during their visit in the ED. Details of ED course documented in ED workup.     Differential diagnosis includes, but is not limited to:  Gastroenteritis, upper GI bleed peptic ulcer, Lisa-Haney tear, Boerhaave syndrome, cannabis hyperemesis, sepsis, cholecystitis, cholangitis, choledocholithiasis, cholelithiasis, pancreatitis, bowel obstruction    After review of the patients physical exam, ED testing, and history/symptoms, the patient will be admitted as patient has hematemesis with intractable vomiting. [facility] called and case was discussed. Dr. Jacob will accept the admission with recommendations for further evaluation and management, as well as possible GI consult.  Admit orders will be completed. The diagnosis, treatment and plan for admission were discussed with the patient and understanding was verbalized. All questions or concerns have been addressed.     Amount and/or Complexity of Data Reviewed  External Data Reviewed: notes.     Details: Reviewed notes from patient's recent ER visits  for similar symptoms.  Labs: ordered. Decision-making details documented in ED Course.  Radiology: ordered and independent interpretation performed.    Risk  Prescription drug management.               ED Course as of 09/22/24 1442   Sat Sep 21, 2024   1624 Patient examined and assessed. Epigastric abdominal pain and vomiting X 4 days.     Labs, imaging, medication ordered.  [OB]   1710 Patient vomited. Emesis appears to be dark in color. Ordered occult blood test.  [OB]   1737 WBC: 8.47  No leukocytosis [OB]   1737 Hemoglobin: 17.5 [OB]   1737 Hematocrit: 48.6  H/H stable [OB]   1742 Lactic Acid Level(!): 2.7  May be secondary to active vomiting. Patient receiving IV fluids.  [OB]   1743 Anion Gap(!): 19 [OB]   1806 Discussed with Dr. Odonnell. Imaging pending. Will continue to give fluids and reassess labs.  [OB]   1809 Patient continues to vomit.  Will order droperidol. [OB]   1817 Lipase: 20  WNL [OB]   1825 CT abdomen pelvis reviewed:  No acute abnormalities noted. [OB]   1843 Chest x-ray independently reviewed: No acute abnormalities noted. [OB]   1846 Occult Blood(!): Positive [OB]   1847 Leukocyte Esterase, UA: Negative [OB]   1847 NITRITE UA: Negative [OB]   1847 UROBILINOGEN UA(!): 2.0-3.0 [OB]   1847 Ketones, UA(!): 3+ [OB]   1847 Protein, UA(!): 1+ [OB]   1847 Spec Grav UA(!): >1.030 [OB]   2018 Patient continues to vomit.  [OB]   2033 Spoke with [facility] who will admit patient for hematemesis and intractable vomiting. [OB]      ED Course User Index  [OB] Dianna Carter PA-C                           Clinical Impression:  Final diagnoses:  [R10.13] Epigastric pain  [K92.0] Hematemesis          ED Disposition Condition    Observation                 Dianna Carter PA-C  09/22/24 1442

## 2024-09-21 NOTE — Clinical Note
Diagnosis: Hematemesis [578.0.ICD-9-CM]   Future Attending Provider: YORDAN TANNER [[id number]]   Is the patient being sent to ED Observation?: No   Special Needs:: No Special Needs [1]

## 2024-09-22 PROBLEM — K92.0 HEMATEMESIS: Status: ACTIVE | Noted: 2024-09-22

## 2024-09-22 PROBLEM — R10.13 EPIGASTRIC PAIN: Status: ACTIVE | Noted: 2024-09-22

## 2024-09-22 PROBLEM — E87.6 HYPOKALEMIA: Status: ACTIVE | Noted: 2024-09-22

## 2024-09-22 LAB
ANION GAP SERPL CALC-SCNC: 14 MMOL/L (ref 8–16)
BASOPHILS # BLD AUTO: 0.03 K/UL (ref 0–0.2)
BASOPHILS NFR BLD: 0.3 % (ref 0–1.9)
BUN SERPL-MCNC: 11 MG/DL (ref 6–20)
CALCIUM SERPL-MCNC: 9.5 MG/DL (ref 8.7–10.5)
CHLORIDE SERPL-SCNC: 106 MMOL/L (ref 95–110)
CO2 SERPL-SCNC: 22 MMOL/L (ref 23–29)
CREAT SERPL-MCNC: 0.8 MG/DL (ref 0.5–1.4)
DIFFERENTIAL METHOD BLD: ABNORMAL
EOSINOPHIL # BLD AUTO: 0 K/UL (ref 0–0.5)
EOSINOPHIL NFR BLD: 0 % (ref 0–8)
ERYTHROCYTE [DISTWIDTH] IN BLOOD BY AUTOMATED COUNT: 12.2 % (ref 11.5–14.5)
EST. GFR  (NO RACE VARIABLE): >60 ML/MIN/1.73 M^2
GLUCOSE SERPL-MCNC: 86 MG/DL (ref 70–110)
HCT VFR BLD AUTO: 44.8 % (ref 40–54)
HGB BLD-MCNC: 15.7 G/DL (ref 14–18)
IMM GRANULOCYTES # BLD AUTO: 0.03 K/UL (ref 0–0.04)
IMM GRANULOCYTES NFR BLD AUTO: 0.3 % (ref 0–0.5)
LYMPHOCYTES # BLD AUTO: 1.9 K/UL (ref 1–4.8)
LYMPHOCYTES NFR BLD: 16 % (ref 18–48)
MAGNESIUM SERPL-MCNC: 2.1 MG/DL (ref 1.6–2.6)
MCH RBC QN AUTO: 31.1 PG (ref 27–31)
MCHC RBC AUTO-ENTMCNC: 35 G/DL (ref 32–36)
MCV RBC AUTO: 89 FL (ref 82–98)
MONOCYTES # BLD AUTO: 1 K/UL (ref 0.3–1)
MONOCYTES NFR BLD: 8.5 % (ref 4–15)
NEUTROPHILS # BLD AUTO: 8.9 K/UL (ref 1.8–7.7)
NEUTROPHILS NFR BLD: 74.9 % (ref 38–73)
NRBC BLD-RTO: 0 /100 WBC
PHOSPHATE SERPL-MCNC: 4 MG/DL (ref 2.7–4.5)
PLATELET # BLD AUTO: 333 K/UL (ref 150–450)
PMV BLD AUTO: 10.2 FL (ref 9.2–12.9)
POTASSIUM SERPL-SCNC: 3.2 MMOL/L (ref 3.5–5.1)
RBC # BLD AUTO: 5.05 M/UL (ref 4.6–6.2)
SODIUM SERPL-SCNC: 142 MMOL/L (ref 136–145)
WBC # BLD AUTO: 11.8 K/UL (ref 3.9–12.7)

## 2024-09-22 PROCEDURE — 99223 1ST HOSP IP/OBS HIGH 75: CPT | Mod: ,,, | Performed by: INTERNAL MEDICINE

## 2024-09-22 PROCEDURE — 80048 BASIC METABOLIC PNL TOTAL CA: CPT

## 2024-09-22 PROCEDURE — 63600175 PHARM REV CODE 636 W HCPCS

## 2024-09-22 PROCEDURE — G0378 HOSPITAL OBSERVATION PER HR: HCPCS

## 2024-09-22 PROCEDURE — 85025 COMPLETE CBC W/AUTO DIFF WBC: CPT

## 2024-09-22 PROCEDURE — 83735 ASSAY OF MAGNESIUM: CPT

## 2024-09-22 PROCEDURE — 84100 ASSAY OF PHOSPHORUS: CPT

## 2024-09-22 PROCEDURE — 25000003 PHARM REV CODE 250: Performed by: FAMILY MEDICINE

## 2024-09-22 RX ORDER — POLYETHYLENE GLYCOL 3350 17 G/17G
17 POWDER, FOR SOLUTION ORAL 2 TIMES DAILY
Status: DISCONTINUED | OUTPATIENT
Start: 2024-09-22 | End: 2024-09-24 | Stop reason: HOSPADM

## 2024-09-22 RX ORDER — AMOXICILLIN 250 MG
1 CAPSULE ORAL 2 TIMES DAILY
Status: DISCONTINUED | OUTPATIENT
Start: 2024-09-22 | End: 2024-09-24 | Stop reason: HOSPADM

## 2024-09-22 RX ORDER — HYDROMORPHONE HYDROCHLORIDE 1 MG/ML
0.2 INJECTION, SOLUTION INTRAMUSCULAR; INTRAVENOUS; SUBCUTANEOUS EVERY 6 HOURS PRN
Status: DISCONTINUED | OUTPATIENT
Start: 2024-09-22 | End: 2024-09-24 | Stop reason: HOSPADM

## 2024-09-22 RX ORDER — DIPHENHYDRAMINE HYDROCHLORIDE 50 MG/ML
25 INJECTION INTRAMUSCULAR; INTRAVENOUS EVERY 6 HOURS PRN
Status: DISCONTINUED | OUTPATIENT
Start: 2024-09-22 | End: 2024-09-24 | Stop reason: HOSPADM

## 2024-09-22 RX ADMIN — HYDROMORPHONE HYDROCHLORIDE 0.2 MG: 1 INJECTION, SOLUTION INTRAMUSCULAR; INTRAVENOUS; SUBCUTANEOUS at 12:09

## 2024-09-22 RX ADMIN — SENNOSIDES AND DOCUSATE SODIUM 1 TABLET: 8.6; 5 TABLET ORAL at 10:09

## 2024-09-22 RX ADMIN — POLYETHYLENE GLYCOL 3350 17 G: 17 POWDER, FOR SOLUTION ORAL at 10:09

## 2024-09-22 RX ADMIN — POTASSIUM BICARBONATE 25 MEQ: 977.5 TABLET, EFFERVESCENT ORAL at 12:09

## 2024-09-22 RX ADMIN — PROCHLORPERAZINE EDISYLATE 5 MG: 5 INJECTION INTRAMUSCULAR; INTRAVENOUS at 03:09

## 2024-09-22 NOTE — HPI
Oxana Nunez is a 29M with no medical history who presented to [facility] ED w/ CC epigastric pain, vomiting, and constipation x4 days. He had a similar issue on 8/18/24 and 7/21/24 where he was seen and discharged both times from ED. He denies any urinary complaints or alcohol consumption. He does endorse fever, chills, and cough as well during the previous 4 days.   ED workup significant for: CO2 15, anion gap 19 --> 15, LA 2.7 --> 2.2, urine creatinine >450. Both CXR and CT A/P negative for acute processes.    He is admitted to Hospital Medicine with gastroenterology consult.

## 2024-09-22 NOTE — SUBJECTIVE & OBJECTIVE
History reviewed. No pertinent past medical history.    History reviewed. No pertinent surgical history.    Review of patient's allergies indicates:   Allergen Reactions    Opioids - morphine analogues Palpitations       No current facility-administered medications on file prior to encounter.     Current Outpatient Medications on File Prior to Encounter   Medication Sig    hyoscyamine 0.125 mg Subl Place 1 tablet (0.125 mg total) under the tongue every 6 (six) hours as needed (abd pain).    pantoprazole (PROTONIX) 40 MG tablet Take 1 tablet (40 mg total) by mouth once daily.     Family History    None       Tobacco Use    Smoking status: Unknown    Smokeless tobacco: Not on file   Substance and Sexual Activity    Alcohol use: Not on file    Drug use: Not on file    Sexual activity: Not on file     Review of Systems   Constitutional:  Positive for appetite change, chills and fever.   HENT: Negative.     Eyes: Negative.    Respiratory: Negative.     Cardiovascular: Negative.    Gastrointestinal:  Positive for abdominal pain, nausea and vomiting. Negative for abdominal distention, blood in stool and constipation.   Endocrine: Negative.    Genitourinary: Negative.    Musculoskeletal: Negative.    Skin: Negative.    Allergic/Immunologic: Negative.    Neurological: Negative.    Hematological: Negative.    Psychiatric/Behavioral: Negative.       Objective:     Vital Signs (Most Recent):  Temp: 98.1 °F (36.7 °C) (09/21/24 1609)  Pulse: 79 (09/21/24 1732)  Resp: 16 (09/22/24 0056)  BP: 128/79 (09/21/24 1732)  SpO2: 96 % (09/21/24 1732) Vital Signs (24h Range):  Temp:  [98.1 °F (36.7 °C)] 98.1 °F (36.7 °C)  Pulse:  [79-90] 79  Resp:  [16-24] 16  SpO2:  [96 %-99 %] 96 %  BP: (128-133)/(79-90) 128/79     Weight: 68 kg (150 lb)  Body mass index is 22.81 kg/m².     Physical Exam  Vitals and nursing note reviewed.   Constitutional:       General: He is in acute distress.      Appearance: He is ill-appearing.      Comments: pain    HENT:      Head: Normocephalic and atraumatic.      Nose: Nose normal.      Mouth/Throat:      Mouth: Mucous membranes are moist.      Pharynx: Oropharynx is clear.   Eyes:      Extraocular Movements: Extraocular movements intact.      Conjunctiva/sclera: Conjunctivae normal.      Pupils: Pupils are equal, round, and reactive to light.   Cardiovascular:      Rate and Rhythm: Normal rate and regular rhythm.      Pulses: Normal pulses.      Heart sounds: Normal heart sounds. No murmur heard.     No friction rub. No gallop.   Pulmonary:      Effort: Pulmonary effort is normal. No respiratory distress.      Breath sounds: Normal breath sounds. No wheezing, rhonchi or rales.   Abdominal:      General: There is no distension.      Palpations: Abdomen is soft.      Tenderness: There is abdominal tenderness in the epigastric area. There is no guarding or rebound.   Genitourinary:     Comments: urinal  Musculoskeletal:         General: Normal range of motion.      Cervical back: Normal range of motion and neck supple.      Right lower leg: No edema.      Left lower leg: No edema.   Skin:     General: Skin is warm and dry.      Capillary Refill: Capillary refill takes less than 2 seconds.   Neurological:      General: No focal deficit present.      Mental Status: He is alert and oriented to person, place, and time. Mental status is at baseline.   Psychiatric:         Mood and Affect: Mood normal.         Behavior: Behavior normal.         Thought Content: Thought content normal.         Judgment: Judgment normal.              CRANIAL NERVES     CN III, IV, VI   Pupils are equal, round, and reactive to light.       Significant Labs: All pertinent labs within the past 24 hours have been reviewed.  Recent Lab Results  (Last 5 results in the past 24 hours)        09/21/24 2115 09/21/24 2114 09/21/24  2035   09/21/24  1808   09/21/24  1731        Influenza A, Molecular   Negative             Influenza B, Molecular    Negative             Benzodiazepines       Negative         Methadone metabolites       Negative         Phencyclidine       Negative         Amphetamines, Urine       Negative         Anion Gap     15           Appearance, UA       Clear         Bacteria, UA       Rare         Barbituates, Urine       Negative         Bilirubin (UA)       Negative         BUN     10           Calcium     9.0           Chloride     111           CO2     15           Cocaine, Urine       Negative         Color, UA       Yellow         Creatinine     0.9           Urine Creatinine       >450.0         eGFR     >60           Flu A & B Source   Nasal swab             Glucose     117           Glucose, UA       Negative         Hyaline Casts, UA       None         Ketones, UA       3+         Lactic Acid Level     2.2  Comment: Falsely low lactic acid results can be found in samples   containing >=13.0 mg/dL total bilirubin and/or >=3.5 mg/dL   direct bilirubin.             Leukocyte Esterase, UA       Negative         Microscopic Comment       SEE COMMENT  Comment: Other formed elements not mentioned in the report are not   present in the microscopic examination.            NITRITE UA       Negative         Occult Blood         Positive       Blood, UA       Negative         Opiates, Urine       Negative         pH, UA       8.0         Potassium     3.9           Protein, UA       1+  Comment: Recommend a 24 hour urine protein or a urine   protein/creatinine ratio if globulin induced proteinuria is  clinically suspected.            Acceptable Yes               RBC, UA       4         SARS-CoV-2 RNA, Amplification, Qual Negative               Sodium     141           Spec Grav UA       >1.030         Specimen UA       Urine, Clean Catch         Marijuana (THC) Metabolite       Negative         Toxicology Information       SEE COMMENT  Comment: This screen includes the following classes of drugs at the listed    cut-off:    Benzodiazepines 200 ng/ml  Methadone 300 ng/ml  Cocaine metabolite 300 ng/ml  Opiates 300 ng/ml  Barbiturates 200 ng/ml  Amphetamines 1000 ng/ml  Marijuana metabs (THC) 50 ng/ml  Phencyclidine (PCP) 25 ng/ml    This is a screening test. If results do not correlate with clinical   presentation, then a confirmatory send out test is advised.     This report is intended for use in clinical monitoring and management   of   patients. It is not intended for use in employment related drug   testing.           UROBILINOGEN UA       2.0-3.0         WBC, UA       2                                Significant Imaging: I have reviewed all pertinent imaging results/findings within the past 24 hours.

## 2024-09-22 NOTE — CONSULTS
[facility] - Emergency Dept  Gastroenterology  Consult Note    Patient Name: Oxana Nunez  MRN: [id number]  Admission Date: 9/21/2024  Hospital Length of Stay: 0 days  Code Status: Full Code   Attending Provider: Grace Al*   Consulting Provider: Garret Randle MD  Primary Care Physician: No, Primary Doctor  Principal Problem:Hematemesis    Inpatient consult to G[facility]enterology[facility]  Consult performed by: Garret Randle MD  Consult ordered by: Ronda Johnson NP        Subjective:     HPI:   29M with no medical history who presented to [facility] ED w/ CC epigastric pain, vomiting, and constipation x4 days. He had a similar issue on 8/18/24 and 7/21/24 where he was seen and discharged both times from ED. He denies any urinary complaints or alcohol consumption. He does endorse fever, chills, and cough as well during the previous 4 days.   ED workup significant for: CO2 15, anion gap 19 --> 15, LA 2.7 --> 2.2, urine creatinine >450. Both CXR and CT A/P negative for acute processes.      History reviewed. No pertinent past medical history.    History reviewed. No pertinent surgical history.    Review of patient's allergies indicates:   Allergen Reactions    Opioids - morphine analogues Palpitations     Family History    None       Tobacco Use    Smoking status: Unknown    Smokeless tobacco: Not on file   Substance and Sexual Activity    Alcohol use: Not on file    Drug use: Not on file    Sexual activity: Not on file     Review of Systems   Gastrointestinal:  Positive for abdominal distention, abdominal pain, nausea and vomiting.     Objective:     Vital Signs (Most Recent):  Temp: 98.1 °F (36.7 °C) (09/21/24 1609)  Pulse: 90 (09/22/24 0728)  Resp: 16 (09/22/24 0123)  BP: 122/83 (09/22/24 0728)  SpO2: 97 % (09/22/24 0801) Vital Signs (24h Range):  Temp:  [98.1 °F (36.7 °C)] 98.1 °F (36.7 °C)  Pulse:  [79-95] 90  Resp:  [16-24] 16  SpO2:  [93 %-99 %] 97 %  BP: (113-133)/(69-90) 122/83      Weight: 68 kg (150 lb) (09/21/24 1609)  Body mass index is 22.81 kg/m².      Intake/Output Summary (Last 24 hours) at 9/22/2024 1055  Last data filed at 9/21/2024 1945  Gross per 24 hour   Intake 1999 ml   Output --   Net 1999 ml       Lines/Drains/Airways       Peripheral Intravenous Line  Duration                  Peripheral IV - Single Lumen 09/21/24 1641 20 G Anterior;Right Forearm <1 day                     Physical Exam  Constitutional:       Appearance: He is well-developed.   HENT:      Head: Normocephalic and atraumatic.   Eyes:      Conjunctiva/sclera: Conjunctivae normal.   Pulmonary:      Effort: Pulmonary effort is normal. No respiratory distress.   Neurological:      Mental Status: He is alert and oriented to person, place, and time.   Psychiatric:         Behavior: Behavior normal.         Thought Content: Thought content normal.         Judgment: Judgment normal.          Significant Labs:  Recent Lab Results  (Last 5 results in the past 24 hours)        09/22/24  0747   09/22/24  0746   09/21/24 2115 09/21/24 2114 09/21/24 2035        Influenza A, Molecular       Negative         Influenza B, Molecular       Negative         Anion Gap   14       15       Baso # 0.03               Basophil % 0.3               BUN   11       10       Calcium   9.5       9.0       Chloride   106       111       CO2   22       15       Creatinine   0.8       0.9       Differential Method Automated               eGFR   >60       >60       Eos # 0.0               Eos % 0.0               Flu A & B Source       Nasal swab         Glucose   86       117       Gran # (ANC) 8.9               Gran % 74.9               Hematocrit 44.8               Hemoglobin 15.7               Immature Grans (Abs) 0.03  Comment: Mild elevation in immature granulocytes is non specific and   can be seen in a variety of conditions including stress response,   acute inflammation, trauma and pregnancy. Correlation with other   laboratory  and clinical findings is essential.                 Immature Granulocytes 0.3               Lactic Acid Level         2.2  Comment: Falsely low lactic acid results can be found in samples   containing >=13.0 mg/dL total bilirubin and/or >=3.5 mg/dL   direct bilirubin.         Lymph # 1.9               Lymph % 16.0               Magnesium    2.1             MCH 31.1               MCHC 35.0               MCV 89               Mono # 1.0               Mono % 8.5               MPV 10.2               nRBC 0               Phosphorus Level   4.0             Platelet Count 333               Potassium   3.2       3.9        Acceptable     Yes           RBC 5.05               RDW 12.2               SARS-CoV-2 RNA, Amplification, Qual     Negative           Sodium   142       141       WBC 11.80                                      Significant Imaging:  Imaging results within the past 24 hours have been reviewed.  Assessment/Plan:     GI  * Hematemesis  With associated abdominal pain.  By history sounds like he may have had a Lisa-Haney tear.  Fortunately hemoglobin relatively stable   Continue supportive care with antiemetics, PPIs   Plan for EGD tomorrow  Okay to eat today.  NPO after midnight        Thank you for your consult. I will follow-up with patient. Please contact us if you have any additional questions.    Garret Randle MD  Gastroenterology  [facility] - Emergency Dept

## 2024-09-22 NOTE — ASSESSMENT & PLAN NOTE
Patient's most recent potassium results are listed below.   Recent Labs     09/21/24  1652 09/21/24  2035 09/22/24  0746   K 3.7 3.9 3.2*     Plan  - Replete potassium per protocol  - Monitor potassium Daily  - Patient's hypokalemia is stable  -

## 2024-09-22 NOTE — ED NOTES
Patient appears asleep. Eyes are closed; equal expansion and no obvious effort to the rise and fall of patient's chest.

## 2024-09-22 NOTE — SUBJECTIVE & OBJECTIVE
History reviewed. No pertinent past medical history.    History reviewed. No pertinent surgical history.    Review of patient's allergies indicates:   Allergen Reactions    Opioids - morphine analogues Palpitations     Family History    None       Tobacco Use    Smoking status: Unknown    Smokeless tobacco: Not on file   Substance and Sexual Activity    Alcohol use: Not on file    Drug use: Not on file    Sexual activity: Not on file     Review of Systems   Gastrointestinal:  Positive for abdominal distention, abdominal pain, nausea and vomiting.     Objective:     Vital Signs (Most Recent):  Temp: 98.1 °F (36.7 °C) (09/21/24 1609)  Pulse: 90 (09/22/24 0728)  Resp: 16 (09/22/24 0123)  BP: 122/83 (09/22/24 0728)  SpO2: 97 % (09/22/24 0801) Vital Signs (24h Range):  Temp:  [98.1 °F (36.7 °C)] 98.1 °F (36.7 °C)  Pulse:  [79-95] 90  Resp:  [16-24] 16  SpO2:  [93 %-99 %] 97 %  BP: (113-133)/(69-90) 122/83     Weight: 68 kg (150 lb) (09/21/24 1609)  Body mass index is 22.81 kg/m².      Intake/Output Summary (Last 24 hours) at 9/22/2024 1055  Last data filed at 9/21/2024 1945  Gross per 24 hour   Intake 1999 ml   Output --   Net 1999 ml       Lines/Drains/Airways       Peripheral Intravenous Line  Duration                  Peripheral IV - Single Lumen 09/21/24 1641 20 G Anterior;Right Forearm <1 day                     Physical Exam  Constitutional:       Appearance: He is well-developed.   HENT:      Head: Normocephalic and atraumatic.   Eyes:      Conjunctiva/sclera: Conjunctivae normal.   Pulmonary:      Effort: Pulmonary effort is normal. No respiratory distress.   Neurological:      Mental Status: He is alert and oriented to person, place, and time.   Psychiatric:         Behavior: Behavior normal.         Thought Content: Thought content normal.         Judgment: Judgment normal.          Significant Labs:  Recent Lab Results  (Last 5 results in the past 24 hours)        09/22/24  0747   09/22/24  0746    09/21/24 2115 09/21/24 2114 09/21/24 2035        Influenza A, Molecular       Negative         Influenza B, Molecular       Negative         Anion Gap   14       15       Baso # 0.03               Basophil % 0.3               BUN   11       10       Calcium   9.5       9.0       Chloride   106       111       CO2   22       15       Creatinine   0.8       0.9       Differential Method Automated               eGFR   >60       >60       Eos # 0.0               Eos % 0.0               Flu A & B Source       Nasal swab         Glucose   86       117       Gran # (ANC) 8.9               Gran % 74.9               Hematocrit 44.8               Hemoglobin 15.7               Immature Grans (Abs) 0.03  Comment: Mild elevation in immature granulocytes is non specific and   can be seen in a variety of conditions including stress response,   acute inflammation, trauma and pregnancy. Correlation with other   laboratory and clinical findings is essential.                 Immature Granulocytes 0.3               Lactic Acid Level         2.2  Comment: Falsely low lactic acid results can be found in samples   containing >=13.0 mg/dL total bilirubin and/or >=3.5 mg/dL   direct bilirubin.         Lymph # 1.9               Lymph % 16.0               Magnesium    2.1             MCH 31.1               MCHC 35.0               MCV 89               Mono # 1.0               Mono % 8.5               MPV 10.2               nRBC 0               Phosphorus Level   4.0             Platelet Count 333               Potassium   3.2       3.9        Acceptable     Yes           RBC 5.05               RDW 12.2               SARS-CoV-2 RNA, Amplification, Qual     Negative           Sodium   142       141       WBC 11.80                                      Significant Imaging:  Imaging results within the past 24 hours have been reviewed.

## 2024-09-22 NOTE — ASSESSMENT & PLAN NOTE
Patient has acute blood loss due to hemorrhage, the hemorrhage is due to gastrointestinal bleed, patient does not have a propensity for bleeding.. Will trend hemoglobin/hematocrit Every 6 hours, as well as monitor and correct for any coagulation defects. CBC and vital signs have been reviewed and last CBC was noted-   Lab Results   Component Value Date    WBC 11.80 09/22/2024    HGB 15.7 09/22/2024    HCT 44.8 09/22/2024    MCV 89 09/22/2024     09/22/2024         Will order a type and screen and consent patient for blood transfusion. Will transfuse if Hgb is <7g/dl (<8g/dl in cases of active ACS) or if patient has rapid bleeding leading to hemodynamic instability.    NPO-clear liquid diet today and NPO after midnight  GI consulted-appreciate rec EGD in a.m.  PPI started  PRN pain and nausea control

## 2024-09-22 NOTE — ED NOTES
Pt laying in bed complaining of abd pain and gagging. Guarding abd. Family at bedside. Md contacted about nausea medication.

## 2024-09-22 NOTE — ED NOTES
Clear liquid diet ordered for patient at the provider's request. Patient to be NPO at midnight tonight in advance of GI procedure planned tomorrow.

## 2024-09-22 NOTE — H&P
[facility]  History & Physical    Patient Name: Oxana Nunez  MRN: [id number]  Patient Class: OP- Observation  Admission Date: 9/21/2024  Attending Physician: Grace Al*   Primary Care Provider: Ashley Primary Doctor         Patient information was obtained from patient, past medical records, and ER records.     Subjective:     Principal Problem:Hematemesis    Chief Complaint:   Chief Complaint   Patient presents with    Abdominal Pain     C/o upper ABD pain with N/V, bloody vomit and subjective fever x4 days. Denies other sx. No vomiting in triage.         HPI: Oxana Nunez is a 29M with no medical history who presented to [facility] ED w/ CC epigastric pain, vomiting, and constipation x4 days. He had a similar issue on 8/18/24 and 7/21/24 where he was seen and discharged both times from ED. He denies any urinary complaints or alcohol consumption. He does endorse fever, chills, and cough as well during the previous 4 days.   ED workup significant for: CO2 15, anion gap 19 --> 15, LA 2.7 --> 2.2, urine creatinine >450. Both CXR and CT A/P negative for acute processes.    He is admitted to Hospital Medicine with gastroenterology consult.      History reviewed. No pertinent past medical history.    History reviewed. No pertinent surgical history.    Review of patient's allergies indicates:   Allergen Reactions    Opioids - morphine analogues Palpitations       No current facility-administered medications on file prior to encounter.     Current Outpatient Medications on File Prior to Encounter   Medication Sig    hyoscyamine 0.125 mg Subl Place 1 tablet (0.125 mg total) under the tongue every 6 (six) hours as needed (abd pain).    pantoprazole (PROTONIX) 40 MG tablet Take 1 tablet (40 mg total) by mouth once daily.     Family History    None       Tobacco Use    Smoking status: Unknown    Smokeless tobacco: Not on file   Substance and Sexual Activity    Alcohol  use: Not on file    Drug use: Not on file    Sexual activity: Not on file     Review of Systems   Constitutional:  Positive for appetite change, chills and fever.   HENT: Negative.     Eyes: Negative.    Respiratory: Negative.     Cardiovascular: Negative.    Gastrointestinal:  Positive for abdominal pain, nausea and vomiting. Negative for abdominal distention, blood in stool and constipation.   Endocrine: Negative.    Genitourinary: Negative.    Musculoskeletal: Negative.    Skin: Negative.    Allergic/Immunologic: Negative.    Neurological: Negative.    Hematological: Negative.    Psychiatric/Behavioral: Negative.       Objective:     Vital Signs (Most Recent):  Temp: 98.1 °F (36.7 °C) (09/21/24 1609)  Pulse: 79 (09/21/24 1732)  Resp: 16 (09/22/24 0056)  BP: 128/79 (09/21/24 1732)  SpO2: 96 % (09/21/24 1732) Vital Signs (24h Range):  Temp:  [98.1 °F (36.7 °C)] 98.1 °F (36.7 °C)  Pulse:  [79-90] 79  Resp:  [16-24] 16  SpO2:  [96 %-99 %] 96 %  BP: (128-133)/(79-90) 128/79     Weight: 68 kg (150 lb)  Body mass index is 22.81 kg/m².     Physical Exam  Vitals and nursing note reviewed.   Constitutional:       General: He is in acute distress.      Appearance: He is ill-appearing.      Comments: pain   HENT:      Head: Normocephalic and atraumatic.      Nose: Nose normal.      Mouth/Throat:      Mouth: Mucous membranes are moist.      Pharynx: Oropharynx is clear.   Eyes:      Extraocular Movements: Extraocular movements intact.      Conjunctiva/sclera: Conjunctivae normal.      Pupils: Pupils are equal, round, and reactive to light.   Cardiovascular:      Rate and Rhythm: Normal rate and regular rhythm.      Pulses: Normal pulses.      Heart sounds: Normal heart sounds. No murmur heard.     No friction rub. No gallop.   Pulmonary:      Effort: Pulmonary effort is normal. No respiratory distress.      Breath sounds: Normal breath sounds. No wheezing, rhonchi or rales.   Abdominal:      General: There is no distension.       Palpations: Abdomen is soft.      Tenderness: There is abdominal tenderness in the epigastric area. There is no guarding or rebound.   Genitourinary:     Comments: urinal  Musculoskeletal:         General: Normal range of motion.      Cervical back: Normal range of motion and neck supple.      Right lower leg: No edema.      Left lower leg: No edema.   Skin:     General: Skin is warm and dry.      Capillary Refill: Capillary refill takes less than 2 seconds.   Neurological:      General: No focal deficit present.      Mental Status: He is alert and oriented to person, place, and time. Mental status is at baseline.   Psychiatric:         Mood and Affect: Mood normal.         Behavior: Behavior normal.         Thought Content: Thought content normal.         Judgment: Judgment normal.              CRANIAL NERVES     CN III, IV, VI   Pupils are equal, round, and reactive to light.       Significant Labs: All pertinent labs within the past 24 hours have been reviewed.  Recent Lab Results  (Last 5 results in the past 24 hours)        09/21/24  2115   09/21/24  2114   09/21/24  2035   09/21/24  1808   09/21/24  1731        Influenza A, Molecular   Negative             Influenza B, Molecular   Negative             Benzodiazepines       Negative         Methadone metabolites       Negative         Phencyclidine       Negative         Amphetamines, Urine       Negative         Anion Gap     15           Appearance, UA       Clear         Bacteria, UA       Rare         Barbituates, Urine       Negative         Bilirubin (UA)       Negative         BUN     10           Calcium     9.0           Chloride     111           CO2     15           Cocaine, Urine       Negative         Color, UA       Yellow         Creatinine     0.9           Urine Creatinine       >450.0         eGFR     >60           Flu A & B Source   Nasal swab             Glucose     117           Glucose, UA       Negative         Hyaline Casts, UA        None         Ketones, UA       3+         Lactic Acid Level     2.2  Comment: Falsely low lactic acid results can be found in samples   containing >=13.0 mg/dL total bilirubin and/or >=3.5 mg/dL   direct bilirubin.             Leukocyte Esterase, UA       Negative         Microscopic Comment       SEE COMMENT  Comment: Other formed elements not mentioned in the report are not   present in the microscopic examination.            NITRITE UA       Negative         Occult Blood         Positive       Blood, UA       Negative         Opiates, Urine       Negative         pH, UA       8.0         Potassium     3.9           Protein, UA       1+  Comment: Recommend a 24 hour urine protein or a urine   protein/creatinine ratio if globulin induced proteinuria is  clinically suspected.            Acceptable Yes               RBC, UA       4         SARS-CoV-2 RNA, Amplification, Qual Negative               Sodium     141           Spec Grav UA       >1.030         Specimen UA       Urine, Clean Catch         Marijuana (THC) Metabolite       Negative         Toxicology Information       SEE COMMENT  Comment: This screen includes the following classes of drugs at the listed   cut-off:    Benzodiazepines 200 ng/ml  Methadone 300 ng/ml  Cocaine metabolite 300 ng/ml  Opiates 300 ng/ml  Barbiturates 200 ng/ml  Amphetamines 1000 ng/ml  Marijuana metabs (THC) 50 ng/ml  Phencyclidine (PCP) 25 ng/ml    This is a screening test. If results do not correlate with clinical   presentation, then a confirmatory send out test is advised.     This report is intended for use in clinical monitoring and management   of   patients. It is not intended for use in employment related drug   testing.           UROBILINOGEN UA       2.0-3.0         WBC, UA       2                                Significant Imaging: I have reviewed all pertinent imaging results/findings within the past 24 hours.  Assessment/Plan:     * Hematemesis  Patient  has acute blood loss due to hemorrhage, the hemorrhage is due to gastrointestinal bleed, patient does not have a propensity for bleeding.. Will trend hemoglobin/hematocrit Every 6 hours, as well as monitor and correct for any coagulation defects. CBC and vital signs have been reviewed and last CBC was noted-   Lab Results   Component Value Date    WBC 8.47 09/21/2024    HGB 17.5 09/21/2024    HCT 48.6 09/21/2024    MCV 87 09/21/2024     09/21/2024         Will order a type and screen and consent patient for blood transfusion. Will transfuse if Hgb is <7g/dl (<8g/dl in cases of active ACS) or if patient has rapid bleeding leading to hemodynamic instability.    NPO  GI consulted  PPI started  PRN pain and nausea control    Epigastric pain  See plan for hematemesis        VTE Risk Mitigation (From admission, onward)           Ordered     IP VTE LOW RISK PATIENT  Once         09/21/24 2035     Place sequential compression device  Until discontinued         09/21/24 2035                         On 09/22/2024, patient should be placed in hospital observation services under my care in collaboration with MD Mikaela.         Ronda Johnson, MSc, AGAP-C  Department of Hospital Medicine  [facility]

## 2024-09-22 NOTE — HPI
29M with no medical history who presented to [facility] ED w/ CC epigastric pain, vomiting, and constipation x4 days. He had a similar issue on 8/18/24 and 7/21/24 where he was seen and discharged both times from ED. He denies any urinary complaints or alcohol consumption. He does endorse fever, chills, and cough as well during the previous 4 days.   ED workup significant for: CO2 15, anion gap 19 --> 15, LA 2.7 --> 2.2, urine creatinine >450. Both CXR and CT A/P negative for acute processes.

## 2024-09-22 NOTE — SUBJECTIVE & OBJECTIVE
Interval History:  Awake, alert, deny nausea and vomiting this morning.  Tolerating liquid diet.  Replace potassium.    Appreciate GI recs-possible EGD in a.m.  Rounding done with  service Arthur [id number]    Review of Systems   Constitutional:  Positive for appetite change. Negative for chills and fever.   Gastrointestinal:  Positive for abdominal pain. Negative for abdominal distention, blood in stool, constipation, nausea and vomiting.   Psychiatric/Behavioral:  Negative for agitation.      Objective:     Vital Signs (Most Recent):  Temp: 98.1 °F (36.7 °C) (09/21/24 1609)  Pulse: 90 (09/22/24 0728)  Resp: 16 (09/22/24 0123)  BP: 122/83 (09/22/24 0728)  SpO2: 97 % (09/22/24 0801) Vital Signs (24h Range):  Temp:  [98.1 °F (36.7 °C)] 98.1 °F (36.7 °C)  Pulse:  [79-95] 90  Resp:  [16-24] 16  SpO2:  [93 %-99 %] 97 %  BP: (113-133)/(69-90) 122/83     Weight: 68 kg (150 lb)  Body mass index is 22.81 kg/m².    Intake/Output Summary (Last 24 hours) at 9/22/2024 1104  Last data filed at 9/21/2024 1945  Gross per 24 hour   Intake 1999 ml   Output --   Net 1999 ml         Physical Exam  Vitals and nursing note reviewed.   Constitutional:       General: He is not in acute distress.     Appearance: He is not ill-appearing.      Comments: pain   HENT:      Head: Normocephalic and atraumatic.   Cardiovascular:      Rate and Rhythm: Normal rate and regular rhythm.      Pulses: Normal pulses.      Heart sounds: Normal heart sounds. No murmur heard.     No friction rub. No gallop.   Pulmonary:      Effort: Pulmonary effort is normal. No respiratory distress.      Breath sounds: Normal breath sounds. No rhonchi.   Abdominal:      General: There is no distension.      Palpations: Abdomen is soft.      Tenderness: There is abdominal tenderness in the epigastric area. There is no guarding or rebound.   Musculoskeletal:         General: Normal range of motion.      Cervical back: Normal range of motion and neck supple.       "Right lower leg: No edema.      Left lower leg: No edema.   Skin:     General: Skin is warm and dry.      Capillary Refill: Capillary refill takes less than 2 seconds.   Neurological:      General: No focal deficit present.      Mental Status: He is alert and oriented to person, place, and time.   Psychiatric:         Mood and Affect: Mood normal.         Behavior: Behavior normal.             Significant Labs: A1C: No results for input(s): "HGBA1C" in the last 4320 hours.  ABGs: No results for input(s): "PH", "PCO2", "HCO3", "POCSATURATED", "BE", "TOTALHB", "COHB", "METHB", "O2HB", "POCFIO2", "PO2" in the last 48 hours.  Blood Culture: No results for input(s): "LABBLOO" in the last 48 hours.  CBC:   Recent Labs   Lab 09/21/24 1652 09/22/24  0747   WBC 8.47 11.80   HGB 17.5 15.7   HCT 48.6 44.8    333     CMP:   Recent Labs   Lab 09/21/24 1652 09/21/24 2035 09/22/24  0746    141 142   K 3.7 3.9 3.2*    111* 106   CO2 15* 15* 22*   * 117* 86   BUN 12 10 11   CREATININE 1.0 0.9 0.8   CALCIUM 10.4 9.0 9.5   PROT 8.3  --   --    ALBUMIN 4.8  --   --    BILITOT 0.8  --   --    ALKPHOS 89  --   --    AST 16  --   --    ALT 31  --   --    ANIONGAP 19* 15 14     Lactic Acid:   Recent Labs   Lab 09/21/24 1652 09/21/24 2035   LACTATE 2.7* 2.2     Lipase:   Recent Labs   Lab 09/21/24 1652   LIPASE 20     Lipid Panel: No results for input(s): "CHOL", "HDL", "LDLCALC", "TRIG", "CHOLHDL" in the last 48 hours.  Magnesium:   Recent Labs   Lab 09/22/24  0746   MG 2.1     Troponin: No results for input(s): "TROPONINI", "TROPONINIHS" in the last 48 hours.  TSH: No results for input(s): "TSH" in the last 4320 hours.  Urine Culture: No results for input(s): "LABURIN" in the last 48 hours.  Urine Studies:   Recent Labs   Lab 09/21/24  1808   COLORU Yellow   APPEARANCEUA Clear   PHUR 8.0   SPECGRAV >1.030*   PROTEINUA 1+*   GLUCUA Negative   KETONESU 3+*   BILIRUBINUA Negative   OCCULTUA Negative   NITRITE " Negative   UROBILINOGEN 2.0-3.0*   LEUKOCYTESUR Negative   RBCUA 4   WBCUA 2   BACTERIA Rare   HYALINECASTS None       Significant Imaging: I have reviewed all pertinent imaging results/findings within the past 24 hours.

## 2024-09-22 NOTE — PROGRESS NOTES
[facility] Emergency Dept  [facility] Medicine  Progress Note    Patient Name: Oxana Nunez  MRN: [id number]  Patient Class: OP- Observation   Admission Date: 9/21/2024  Length of Stay: 0 days  Attending Physician: Grace Al*  Primary Care Provider: Ashley, Primary Doctor        Subjective:     Principal Problem:Hematemesis        HPI:  Oxana Nunez is a 29M with no medical history who presented to [facility] ED w/ CC epigastric pain, vomiting, and constipation x4 days. He had a similar issue on 8/18/24 and 7/21/24 where he was seen and discharged both times from ED. He denies any urinary complaints or alcohol consumption. He does endorse fever, chills, and cough as well during the previous 4 days.   ED workup significant for: CO2 15, anion gap 19 --> 15, LA 2.7 --> 2.2, urine creatinine >450. Both CXR and CT A/P negative for acute processes.    He is admitted to Hospital Medicine with gastroenterology consult.      Overview/Hospital Course:  No notes on file    Interval History:  Awake, alert, deny nausea and vomiting this morning.  Tolerating liquid diet.  Replace potassium.    Appreciate GI recs-possible EGD in a.m.  Rounding done with  service Arthur [id number]    Review of Systems   Constitutional:  Positive for appetite change. Negative for chills and fever.   Gastrointestinal:  Positive for abdominal pain. Negative for abdominal distention, blood in stool, constipation, nausea and vomiting.   Psychiatric/Behavioral:  Negative for agitation.      Objective:     Vital Signs (Most Recent):  Temp: 98.1 °F (36.7 °C) (09/21/24 1609)  Pulse: 90 (09/22/24 0728)  Resp: 16 (09/22/24 0123)  BP: 122/83 (09/22/24 0728)  SpO2: 97 % (09/22/24 0801) Vital Signs (24h Range):  Temp:  [98.1 °F (36.7 °C)] 98.1 °F (36.7 °C)  Pulse:  [79-95] 90  Resp:  [16-24] 16  SpO2:  [93 %-99 %] 97 %  BP: (113-133)/(69-90) 122/83     Weight: 68 kg (150 lb)  Body mass index is 22.81 kg/m².    Intake/Output Summary  "(Last 24 hours) at 9/22/2024 1104  Last data filed at 9/21/2024 1945  Gross per 24 hour   Intake 1999 ml   Output --   Net 1999 ml         Physical Exam  Vitals and nursing note reviewed.   Constitutional:       General: He is not in acute distress.     Appearance: He is not ill-appearing.      Comments: pain   HENT:      Head: Normocephalic and atraumatic.   Cardiovascular:      Rate and Rhythm: Normal rate and regular rhythm.      Pulses: Normal pulses.      Heart sounds: Normal heart sounds. No murmur heard.     No friction rub. No gallop.   Pulmonary:      Effort: Pulmonary effort is normal. No respiratory distress.      Breath sounds: Normal breath sounds. No rhonchi.   Abdominal:      General: There is no distension.      Palpations: Abdomen is soft.      Tenderness: There is abdominal tenderness in the epigastric area. There is no guarding or rebound.   Musculoskeletal:         General: Normal range of motion.      Cervical back: Normal range of motion and neck supple.      Right lower leg: No edema.      Left lower leg: No edema.   Skin:     General: Skin is warm and dry.      Capillary Refill: Capillary refill takes less than 2 seconds.   Neurological:      General: No focal deficit present.      Mental Status: He is alert and oriented to person, place, and time.   Psychiatric:         Mood and Affect: Mood normal.         Behavior: Behavior normal.             Significant Labs: A1C: No results for input(s): "HGBA1C" in the last 4320 hours.  ABGs: No results for input(s): "PH", "PCO2", "HCO3", "POCSATURATED", "BE", "TOTALHB", "COHB", "METHB", "O2HB", "POCFIO2", "PO2" in the last 48 hours.  Blood Culture: No results for input(s): "LABBLOO" in the last 48 hours.  CBC:   Recent Labs   Lab 09/21/24  1652 09/22/24  0747   WBC 8.47 11.80   HGB 17.5 15.7   HCT 48.6 44.8    333     CMP:   Recent Labs   Lab 09/21/24  1652 09/21/24  2035 09/22/24  0746    141 142   K 3.7 3.9 3.2*    111* 106   CO2 " "15* 15* 22*   * 117* 86   BUN 12 10 11   CREATININE 1.0 0.9 0.8   CALCIUM 10.4 9.0 9.5   PROT 8.3  --   --    ALBUMIN 4.8  --   --    BILITOT 0.8  --   --    ALKPHOS 89  --   --    AST 16  --   --    ALT 31  --   --    ANIONGAP 19* 15 14     Lactic Acid:   Recent Labs   Lab 09/21/24  1652 09/21/24  2035   LACTATE 2.7* 2.2     Lipase:   Recent Labs   Lab 09/21/24  1652   LIPASE 20     Lipid Panel: No results for input(s): "CHOL", "HDL", "LDLCALC", "TRIG", "CHOLHDL" in the last 48 hours.  Magnesium:   Recent Labs   Lab 09/22/24  0746   MG 2.1     Troponin: No results for input(s): "TROPONINI", "TROPONINIHS" in the last 48 hours.  TSH: No results for input(s): "TSH" in the last 4320 hours.  Urine Culture: No results for input(s): "LABURIN" in the last 48 hours.  Urine Studies:   Recent Labs   Lab 09/21/24  1808   COLORU Yellow   APPEARANCEUA Clear   PHUR 8.0   SPECGRAV >1.030*   PROTEINUA 1+*   GLUCUA Negative   KETONESU 3+*   BILIRUBINUA Negative   OCCULTUA Negative   NITRITE Negative   UROBILINOGEN 2.0-3.0*   LEUKOCYTESUR Negative   RBCUA 4   WBCUA 2   BACTERIA Rare   HYALINECASTS None       Significant Imaging: I have reviewed all pertinent imaging results/findings within the past 24 hours.    Assessment/Plan:      * Hematemesis  Patient has acute blood loss due to hemorrhage, the hemorrhage is due to gastrointestinal bleed, patient does not have a propensity for bleeding.. Will trend hemoglobin/hematocrit Every 6 hours, as well as monitor and correct for any coagulation defects. CBC and vital signs have been reviewed and last CBC was noted-   Lab Results   Component Value Date    WBC 11.80 09/22/2024    HGB 15.7 09/22/2024    HCT 44.8 09/22/2024    MCV 89 09/22/2024     09/22/2024         Will order a type and screen and consent patient for blood transfusion. Will transfuse if Hgb is <7g/dl (<8g/dl in cases of active ACS) or if patient has rapid bleeding leading to hemodynamic " instability.    NPO-clear liquid diet today and NPO after midnight  GI consulted-appreciate rec EGD in a.m.  PPI started  PRN pain and nausea control    Hypokalemia  Patient's most recent potassium results are listed below.   Recent Labs     09/21/24  1652 09/21/24 2035 09/22/24  0746   K 3.7 3.9 3.2*     Plan  - Replete potassium per protocol  - Monitor potassium Daily  - Patient's hypokalemia is stable  -     Epigastric pain  See plan for hematemesis        VTE Risk Mitigation (From admission, onward)           Ordered     IP VTE LOW RISK PATIENT  Once         09/21/24 2035     Place sequential compression device  Until discontinued         09/21/24 2035                    Discharge Planning   ONOFRE:      Code Status: Full Code   Is the patient medically ready for discharge?:     Reason for patient still in hospital (select all that apply): Patient trending condition               Grace Al MD  Department of Hospital Medicine   [facility] - Emergency Dept

## 2024-09-22 NOTE — ASSESSMENT & PLAN NOTE
With associated abdominal pain.  By history sounds like he may have had a Lisa-Haney tear.  Fortunately hemoglobin relatively stable   Continue supportive care with antiemetics, PPIs   Plan for EGD tomorrow  Okay to eat today.  NPO after midnight

## 2024-09-22 NOTE — ED NOTES
Pt resting comfortably in bed at this time with no issues or complaints. Continual cardiac monitor applied to pt and bed in lowest, locked position with call light within reach.

## 2024-09-22 NOTE — ASSESSMENT & PLAN NOTE
Patient has acute blood loss due to hemorrhage, the hemorrhage is due to gastrointestinal bleed, patient does not have a propensity for bleeding.. Will trend hemoglobin/hematocrit Every 6 hours, as well as monitor and correct for any coagulation defects. CBC and vital signs have been reviewed and last CBC was noted-   Lab Results   Component Value Date    WBC 8.47 09/21/2024    HGB 17.5 09/21/2024    HCT 48.6 09/21/2024    MCV 87 09/21/2024     09/21/2024         Will order a type and screen and consent patient for blood transfusion. Will transfuse if Hgb is <7g/dl (<8g/dl in cases of active ACS) or if patient has rapid bleeding leading to hemodynamic instability.    NPO  GI consulted  PPI started  PRN pain and nausea control

## 2024-09-23 ENCOUNTER — ANESTHESIA (OUTPATIENT)
Dept: ENDOSCOPY | Facility: HOSPITAL | Age: 29
DRG: 377 | End: 2024-09-23

## 2024-09-23 ENCOUNTER — ANESTHESIA EVENT (OUTPATIENT)
Dept: ENDOSCOPY | Facility: HOSPITAL | Age: 29
DRG: 377 | End: 2024-09-23

## 2024-09-23 LAB
ABO + RH BLD: NORMAL
ANION GAP SERPL CALC-SCNC: 13 MMOL/L (ref 8–16)
BASOPHILS # BLD AUTO: 0.03 K/UL (ref 0–0.2)
BASOPHILS # BLD AUTO: 0.04 K/UL (ref 0–0.2)
BASOPHILS # BLD AUTO: 0.04 K/UL (ref 0–0.2)
BASOPHILS NFR BLD: 0.4 % (ref 0–1.9)
BASOPHILS NFR BLD: 0.5 % (ref 0–1.9)
BASOPHILS NFR BLD: 0.5 % (ref 0–1.9)
BLD GP AB SCN CELLS X3 SERPL QL: NORMAL
BUN SERPL-MCNC: 14 MG/DL (ref 6–20)
CALCIUM SERPL-MCNC: 9.8 MG/DL (ref 8.7–10.5)
CHLORIDE SERPL-SCNC: 103 MMOL/L (ref 95–110)
CO2 SERPL-SCNC: 21 MMOL/L (ref 23–29)
CREAT SERPL-MCNC: 0.9 MG/DL (ref 0.5–1.4)
DIFFERENTIAL METHOD BLD: ABNORMAL
EOSINOPHIL # BLD AUTO: 0 K/UL (ref 0–0.5)
EOSINOPHIL NFR BLD: 0 % (ref 0–8)
EOSINOPHIL NFR BLD: 0 % (ref 0–8)
EOSINOPHIL NFR BLD: 0.1 % (ref 0–8)
ERYTHROCYTE [DISTWIDTH] IN BLOOD BY AUTOMATED COUNT: 11.8 % (ref 11.5–14.5)
ERYTHROCYTE [DISTWIDTH] IN BLOOD BY AUTOMATED COUNT: 11.8 % (ref 11.5–14.5)
ERYTHROCYTE [DISTWIDTH] IN BLOOD BY AUTOMATED COUNT: 11.9 % (ref 11.5–14.5)
EST. GFR  (NO RACE VARIABLE): >60 ML/MIN/1.73 M^2
GLUCOSE SERPL-MCNC: 95 MG/DL (ref 70–110)
HCT VFR BLD AUTO: 46.1 % (ref 40–54)
HCT VFR BLD AUTO: 46.4 % (ref 40–54)
HCT VFR BLD AUTO: 46.6 % (ref 40–54)
HGB BLD-MCNC: 16.3 G/DL (ref 14–18)
HGB BLD-MCNC: 16.4 G/DL (ref 14–18)
HGB BLD-MCNC: 16.4 G/DL (ref 14–18)
IMM GRANULOCYTES # BLD AUTO: 0.02 K/UL (ref 0–0.04)
IMM GRANULOCYTES # BLD AUTO: 0.04 K/UL (ref 0–0.04)
IMM GRANULOCYTES # BLD AUTO: 0.08 K/UL (ref 0–0.04)
IMM GRANULOCYTES NFR BLD AUTO: 0.2 % (ref 0–0.5)
IMM GRANULOCYTES NFR BLD AUTO: 0.5 % (ref 0–0.5)
IMM GRANULOCYTES NFR BLD AUTO: 1 % (ref 0–0.5)
LYMPHOCYTES # BLD AUTO: 1.3 K/UL (ref 1–4.8)
LYMPHOCYTES # BLD AUTO: 1.6 K/UL (ref 1–4.8)
LYMPHOCYTES # BLD AUTO: 1.8 K/UL (ref 1–4.8)
LYMPHOCYTES NFR BLD: 15.8 % (ref 18–48)
LYMPHOCYTES NFR BLD: 18.2 % (ref 18–48)
LYMPHOCYTES NFR BLD: 21.8 % (ref 18–48)
MAGNESIUM SERPL-MCNC: 2 MG/DL (ref 1.6–2.6)
MCH RBC QN AUTO: 30.9 PG (ref 27–31)
MCH RBC QN AUTO: 31.2 PG (ref 27–31)
MCH RBC QN AUTO: 31.5 PG (ref 27–31)
MCHC RBC AUTO-ENTMCNC: 35.2 G/DL (ref 32–36)
MCHC RBC AUTO-ENTMCNC: 35.3 G/DL (ref 32–36)
MCHC RBC AUTO-ENTMCNC: 35.4 G/DL (ref 32–36)
MCV RBC AUTO: 88 FL (ref 82–98)
MCV RBC AUTO: 88 FL (ref 82–98)
MCV RBC AUTO: 89 FL (ref 82–98)
MONOCYTES # BLD AUTO: 0.4 K/UL (ref 0.3–1)
MONOCYTES # BLD AUTO: 0.5 K/UL (ref 0.3–1)
MONOCYTES # BLD AUTO: 0.6 K/UL (ref 0.3–1)
MONOCYTES NFR BLD: 5.1 % (ref 4–15)
MONOCYTES NFR BLD: 6.4 % (ref 4–15)
MONOCYTES NFR BLD: 6.4 % (ref 4–15)
NEUTROPHILS # BLD AUTO: 5.7 K/UL (ref 1.8–7.7)
NEUTROPHILS # BLD AUTO: 6.4 K/UL (ref 1.8–7.7)
NEUTROPHILS # BLD AUTO: 6.6 K/UL (ref 1.8–7.7)
NEUTROPHILS NFR BLD: 70.3 % (ref 38–73)
NEUTROPHILS NFR BLD: 74.4 % (ref 38–73)
NEUTROPHILS NFR BLD: 78.4 % (ref 38–73)
NRBC BLD-RTO: 0 /100 WBC
PHOSPHATE SERPL-MCNC: 3.7 MG/DL (ref 2.7–4.5)
PLATELET # BLD AUTO: 328 K/UL (ref 150–450)
PLATELET # BLD AUTO: 335 K/UL (ref 150–450)
PLATELET # BLD AUTO: 336 K/UL (ref 150–450)
PMV BLD AUTO: 10.1 FL (ref 9.2–12.9)
PMV BLD AUTO: 9.8 FL (ref 9.2–12.9)
PMV BLD AUTO: 9.8 FL (ref 9.2–12.9)
POTASSIUM SERPL-SCNC: 3.7 MMOL/L (ref 3.5–5.1)
RBC # BLD AUTO: 5.21 M/UL (ref 4.6–6.2)
RBC # BLD AUTO: 5.23 M/UL (ref 4.6–6.2)
RBC # BLD AUTO: 5.3 M/UL (ref 4.6–6.2)
SODIUM SERPL-SCNC: 137 MMOL/L (ref 136–145)
SPECIMEN OUTDATE: NORMAL
WBC # BLD AUTO: 8.15 K/UL (ref 3.9–12.7)
WBC # BLD AUTO: 8.44 K/UL (ref 3.9–12.7)
WBC # BLD AUTO: 8.55 K/UL (ref 3.9–12.7)

## 2024-09-23 PROCEDURE — 83735 ASSAY OF MAGNESIUM: CPT

## 2024-09-23 PROCEDURE — 84100 ASSAY OF PHOSPHORUS: CPT

## 2024-09-23 PROCEDURE — 36415 COLL VENOUS BLD VENIPUNCTURE: CPT | Performed by: FAMILY MEDICINE

## 2024-09-23 PROCEDURE — 43239 EGD BIOPSY SINGLE/MULTIPLE: CPT | Performed by: INTERNAL MEDICINE

## 2024-09-23 PROCEDURE — 86901 BLOOD TYPING SEROLOGIC RH(D): CPT | Performed by: FAMILY MEDICINE

## 2024-09-23 PROCEDURE — 25000003 PHARM REV CODE 250: Performed by: NURSE ANESTHETIST, CERTIFIED REGISTERED

## 2024-09-23 PROCEDURE — 25000003 PHARM REV CODE 250: Performed by: FAMILY MEDICINE

## 2024-09-23 PROCEDURE — 85025 COMPLETE CBC W/AUTO DIFF WBC: CPT | Mod: 91 | Performed by: FAMILY MEDICINE

## 2024-09-23 PROCEDURE — 85025 COMPLETE CBC W/AUTO DIFF WBC: CPT

## 2024-09-23 PROCEDURE — 80048 BASIC METABOLIC PNL TOTAL CA: CPT

## 2024-09-23 PROCEDURE — 63600175 PHARM REV CODE 636 W HCPCS

## 2024-09-23 PROCEDURE — 43239 EGD BIOPSY SINGLE/MULTIPLE: CPT | Mod: ,,, | Performed by: INTERNAL MEDICINE

## 2024-09-23 PROCEDURE — 0DJ08ZZ INSPECTION OF UPPER INTESTINAL TRACT, VIA NATURAL OR ARTIFICIAL OPENING ENDOSCOPIC: ICD-10-PCS | Performed by: INTERNAL MEDICINE

## 2024-09-23 PROCEDURE — 27201012 HC FORCEPS, HOT/COLD, DISP: Performed by: INTERNAL MEDICINE

## 2024-09-23 PROCEDURE — 88312 SPECIAL STAINS GROUP 1: CPT | Performed by: PATHOLOGY

## 2024-09-23 PROCEDURE — 87389 HIV-1 AG W/HIV-1&-2 AB AG IA: CPT | Performed by: FAMILY MEDICINE

## 2024-09-23 PROCEDURE — 86850 RBC ANTIBODY SCREEN: CPT | Performed by: FAMILY MEDICINE

## 2024-09-23 PROCEDURE — 63600175 PHARM REV CODE 636 W HCPCS: Performed by: FAMILY MEDICINE

## 2024-09-23 PROCEDURE — 86900 BLOOD TYPING SEROLOGIC ABO: CPT | Performed by: FAMILY MEDICINE

## 2024-09-23 PROCEDURE — 63600175 PHARM REV CODE 636 W HCPCS: Performed by: NURSE ANESTHETIST, CERTIFIED REGISTERED

## 2024-09-23 PROCEDURE — 37000009 HC ANESTHESIA EA ADD 15 MINS: Performed by: INTERNAL MEDICINE

## 2024-09-23 PROCEDURE — 37000008 HC ANESTHESIA 1ST 15 MINUTES: Performed by: INTERNAL MEDICINE

## 2024-09-23 PROCEDURE — 11000001 HC ACUTE MED/SURG PRIVATE ROOM

## 2024-09-23 PROCEDURE — 88305 TISSUE EXAM BY PATHOLOGIST: CPT | Performed by: PATHOLOGY

## 2024-09-23 RX ORDER — PROPOFOL 10 MG/ML
VIAL (ML) INTRAVENOUS CONTINUOUS PRN
Status: DISCONTINUED | OUTPATIENT
Start: 2024-09-23 | End: 2024-09-23

## 2024-09-23 RX ORDER — LIDOCAINE HYDROCHLORIDE 20 MG/ML
INJECTION INTRAVENOUS
Status: DISCONTINUED | OUTPATIENT
Start: 2024-09-23 | End: 2024-09-23

## 2024-09-23 RX ORDER — PANTOPRAZOLE SODIUM 40 MG/10ML
40 INJECTION, POWDER, LYOPHILIZED, FOR SOLUTION INTRAVENOUS 2 TIMES DAILY
Status: DISCONTINUED | OUTPATIENT
Start: 2024-09-23 | End: 2024-09-23

## 2024-09-23 RX ORDER — PANTOPRAZOLE SODIUM 40 MG/10ML
40 INJECTION, POWDER, LYOPHILIZED, FOR SOLUTION INTRAVENOUS 2 TIMES DAILY
Status: DISCONTINUED | OUTPATIENT
Start: 2024-09-23 | End: 2024-09-24 | Stop reason: HOSPADM

## 2024-09-23 RX ORDER — PROPOFOL 10 MG/ML
VIAL (ML) INTRAVENOUS
Status: DISCONTINUED | OUTPATIENT
Start: 2024-09-23 | End: 2024-09-23

## 2024-09-23 RX ORDER — ONDANSETRON HYDROCHLORIDE 2 MG/ML
4 INJECTION, SOLUTION INTRAVENOUS EVERY 6 HOURS PRN
Status: DISCONTINUED | OUTPATIENT
Start: 2024-09-23 | End: 2024-09-24 | Stop reason: HOSPADM

## 2024-09-23 RX ADMIN — PANTOPRAZOLE SODIUM 40 MG: 40 INJECTION, POWDER, LYOPHILIZED, FOR SOLUTION INTRAVENOUS at 08:09

## 2024-09-23 RX ADMIN — SODIUM CHLORIDE: 0.9 INJECTION, SOLUTION INTRAVENOUS at 02:09

## 2024-09-23 RX ADMIN — ONDANSETRON 4 MG: 2 INJECTION INTRAMUSCULAR; INTRAVENOUS at 08:09

## 2024-09-23 RX ADMIN — PROCHLORPERAZINE EDISYLATE 5 MG: 5 INJECTION INTRAMUSCULAR; INTRAVENOUS at 11:09

## 2024-09-23 RX ADMIN — PROPOFOL 150 MCG/KG/MIN: 10 INJECTION, EMULSION INTRAVENOUS at 03:09

## 2024-09-23 RX ADMIN — GLYCOPYRROLATE 0.2 MG: 0.2 INJECTION, SOLUTION INTRAMUSCULAR; INTRAVITREAL at 03:09

## 2024-09-23 RX ADMIN — PROPOFOL 100 MG: 10 INJECTION, EMULSION INTRAVENOUS at 03:09

## 2024-09-23 RX ADMIN — PROCHLORPERAZINE EDISYLATE 5 MG: 5 INJECTION INTRAMUSCULAR; INTRAVENOUS at 05:09

## 2024-09-23 RX ADMIN — PROPOFOL 40 MG: 10 INJECTION, EMULSION INTRAVENOUS at 03:09

## 2024-09-23 RX ADMIN — LIDOCAINE HYDROCHLORIDE 100 MG: 20 INJECTION, SOLUTION INTRAVENOUS at 03:09

## 2024-09-23 NOTE — SUBJECTIVE & OBJECTIVE
Interval History:  Awake, alert, report nausea.     Appreciate GI recs-possible EGD today.      Review of Systems   Constitutional:  Positive for appetite change. Negative for chills and fever.   Gastrointestinal:  Positive for abdominal pain. Negative for abdominal distention, blood in stool, constipation, nausea and vomiting.   Psychiatric/Behavioral:  Negative for agitation.      Objective:     Vital Signs (Most Recent):  Temp: 98.1 °F (36.7 °C) (09/21/24 1609)  Pulse: 99 (09/23/24 0634)  Resp: 16 (09/23/24 0634)  BP: (!) 154/73 (09/23/24 0634)  SpO2: 97 % (09/23/24 0634) Vital Signs (24h Range):  Pulse:  [70-99] 99  Resp:  [16] 16  SpO2:  [97 %-98 %] 97 %  BP: (124-154)/(73-91) 154/73     Weight: 68 kg (150 lb)  Body mass index is 22.81 kg/m².  No intake or output data in the 24 hours ending 09/23/24 0947        Physical Exam  Vitals and nursing note reviewed.   Constitutional:       General: He is not in acute distress.     Appearance: He is not ill-appearing.      Comments: pain   HENT:      Head: Normocephalic and atraumatic.   Cardiovascular:      Rate and Rhythm: Normal rate and regular rhythm.      Pulses: Normal pulses.      Heart sounds: Normal heart sounds. No murmur heard.     No friction rub. No gallop.   Pulmonary:      Effort: Pulmonary effort is normal. No respiratory distress.      Breath sounds: Normal breath sounds. No rhonchi.   Abdominal:      General: There is no distension.      Palpations: Abdomen is soft.      Tenderness: There is abdominal tenderness in the epigastric area. There is no guarding or rebound.   Musculoskeletal:         General: Normal range of motion.      Cervical back: Normal range of motion and neck supple.      Right lower leg: No edema.      Left lower leg: No edema.   Skin:     General: Skin is warm and dry.      Capillary Refill: Capillary refill takes less than 2 seconds.   Neurological:      General: No focal deficit present.      Mental Status: He is alert and  "oriented to person, place, and time.   Psychiatric:         Mood and Affect: Mood normal.         Behavior: Behavior normal.             Significant Labs: A1C: No results for input(s): "HGBA1C" in the last 4320 hours.  ABGs: No results for input(s): "PH", "PCO2", "HCO3", "POCSATURATED", "BE", "TOTALHB", "COHB", "METHB", "O2HB", "POCFIO2", "PO2" in the last 48 hours.  Blood Culture: No results for input(s): "LABBLOO" in the last 48 hours.  CBC:   Recent Labs   Lab 09/21/24 1652 09/22/24  0747 09/23/24  0645   WBC 8.47 11.80 8.44   HGB 17.5 15.7 16.4   HCT 48.6 44.8 46.4    333 336     CMP:   Recent Labs   Lab 09/21/24 1652 09/21/24 2035 09/22/24  0746 09/23/24  0645    141 142 137   K 3.7 3.9 3.2* 3.7    111* 106 103   CO2 15* 15* 22* 21*   * 117* 86 95   BUN 12 10 11 14   CREATININE 1.0 0.9 0.8 0.9   CALCIUM 10.4 9.0 9.5 9.8   PROT 8.3  --   --   --    ALBUMIN 4.8  --   --   --    BILITOT 0.8  --   --   --    ALKPHOS 89  --   --   --    AST 16  --   --   --    ALT 31  --   --   --    ANIONGAP 19* 15 14 13     Lactic Acid:   Recent Labs   Lab 09/21/24 1652 09/21/24 2035   LACTATE 2.7* 2.2     Lipase:   Recent Labs   Lab 09/21/24 1652   LIPASE 20     Lipid Panel: No results for input(s): "CHOL", "HDL", "LDLCALC", "TRIG", "CHOLHDL" in the last 48 hours.  Magnesium:   Recent Labs   Lab 09/22/24  0746 09/23/24  0645   MG 2.1 2.0     Troponin: No results for input(s): "TROPONINI", "TROPONINIHS" in the last 48 hours.  TSH: No results for input(s): "TSH" in the last 4320 hours.  Urine Culture: No results for input(s): "LABURIN" in the last 48 hours.  Urine Studies:   Recent Labs   Lab 09/21/24  1808   COLORU Yellow   APPEARANCEUA Clear   PHUR 8.0   SPECGRAV >1.030*   PROTEINUA 1+*   GLUCUA Negative   KETONESU 3+*   BILIRUBINUA Negative   OCCULTUA Negative   NITRITE Negative   UROBILINOGEN 2.0-3.0*   LEUKOCYTESUR Negative   RBCUA 4   WBCUA 2   BACTERIA Rare   HYALINECASTS None "       Significant Imaging: I have reviewed all pertinent imaging results/findings within the past 24 hours.

## 2024-09-23 NOTE — ANESTHESIA PREPROCEDURE EVALUATION
09/23/2024  [facility]  Anesthesia Pre-Operative Evaluation         Patient Name: Oxana Nunez  YOB: 1995  MRN: [id number]    SUBJECTIVE:     Pre-operative evaluation for Procedure(s) (LRB):  EGD (ESOPHAGOGASTRODUODENOSCOPY) (N/A)     09/23/2024    Oxana Nunez is a 29 y.o. male w/ no significant PMHx who presents with recurrent N/V with some hematemesis.  H/H stable. HDS and on RA.    Patient now presents for the above procedure(s).    TTE:   none documented.     Patient Active Problem List   Diagnosis    Hematemesis    Epigastric pain    Hypokalemia       Review of patient's allergies indicates:   Allergen Reactions    Opioids - morphine analogues Palpitations       Current Inpatient Medications:   pantoprazole  40 mg Intravenous BID    polyethylene glycol  17 g Oral BID    senna-docusate 8.6-50 mg  1 tablet Oral BID       No current facility-administered medications on file prior to encounter.     Current Outpatient Medications on File Prior to Encounter   Medication Sig Dispense Refill    hyoscyamine 0.125 mg Subl Place 1 tablet (0.125 mg total) under the tongue every 6 (six) hours as needed (abd pain). 30 tablet 0    pantoprazole (PROTONIX) 40 MG tablet Take 1 tablet (40 mg total) by mouth once daily. 30 tablet 2       History reviewed. No pertinent surgical history.    OBJECTIVE:     Vital Signs Range (Last 24H):  Pulse:  [70-99]   Resp:  [16]   BP: (124-154)/(73-91)   SpO2:  [97 %-98 %]       Significant Labs:  Lab Results   Component Value Date    WBC 8.44 09/23/2024    HGB 16.4 09/23/2024    HCT 46.4 09/23/2024     09/23/2024    ALT 31 09/21/2024    AST 16 09/21/2024     09/23/2024    K 3.7 09/23/2024     09/23/2024    CREATININE 0.9 09/23/2024    BUN 14 09/23/2024    CO2 21 (L) 09/23/2024       Diagnostic Studies: No relevant  studies.    EKG:   No results found for this or any previous visit.    ASSESSMENT/PLAN:           Pre-op Assessment    I have reviewed the Patient Summary Reports.     I have reviewed the Nursing Notes. I have reviewed the NPO Status.   I have reviewed the Medications.     Review of Systems  Anesthesia Hx:  No previous Anesthesia             Denies Family Hx of Anesthesia complications.    Denies Personal Hx of Anesthesia complications.                    Hematology/Oncology:  Hematology Normal   Oncology Normal                                   EENT/Dental:  EENT/Dental Normal           Cardiovascular:  Cardiovascular Normal                                            Pulmonary:  Pulmonary Normal                       Renal/:  Renal/ Normal                 Hepatic/GI:  Hepatic/GI Normal                 Neurological:  Neurology Normal                                      Endocrine:  Endocrine Normal            Psych:  Psychiatric Normal                    Physical Exam  General: Cooperative, Alert and Oriented    Airway:  Mallampati: II   Mouth Opening: Normal  TM Distance: Normal  Tongue: Normal  Neck ROM: Normal ROM    Dental:  Intact        Anesthesia Plan  Type of Anesthesia, risks & benefits discussed:    Anesthesia Type: Gen Natural Airway  Intra-op Monitoring Plan: Standard ASA Monitors  Post Op Pain Control Plan: multimodal analgesia  Induction:  IV  Informed Consent: Informed consent signed with the Patient and all parties understand the risks and agree with anesthesia plan.  All questions answered.   ASA Score: 1  Day of Surgery Review of History & Physical: H&P Update referred to the surgeon/provider.    Ready For Surgery From Anesthesia Perspective.     .

## 2024-09-23 NOTE — ASSESSMENT & PLAN NOTE
Patient has acute blood loss due to hemorrhage, the hemorrhage is due to gastrointestinal bleed, patient does not have a propensity for bleeding.. Will trend hemoglobin/hematocrit Every 6 hours, as well as monitor and correct for any coagulation defects. CBC and vital signs have been reviewed and last CBC was noted-   Lab Results   Component Value Date    WBC 8.44 09/23/2024    HGB 16.4 09/23/2024    HCT 46.4 09/23/2024    MCV 89 09/23/2024     09/23/2024         Will order a type and screen and consent patient for blood transfusion. Will transfuse if Hgb is <7g/dl (<8g/dl in cases of active ACS) or if patient has rapid bleeding leading to hemodynamic instability.    NPO-clear liquid diet today and NPO after midnight  GI consulted-appreciate rec EGD today.  PPI started  PRN pain and nausea control

## 2024-09-23 NOTE — NURSING
Patient Darwing Iglesia Nunez lying in bed, in NAD, Pt appears to be resting comfortably, eyes closed,  respirations even and unlabored. Care Continues.

## 2024-09-23 NOTE — TRANSFER OF CARE
"Anesthesia Transfer of Care Note    Patient: Oxana Nunez    Procedure(s) Performed: Procedure(s) (LRB):  EGD (ESOPHAGOGASTRODUODENOSCOPY) (N/A)    Patient location: GI    Anesthesia Type: general    Transport from OR: Transported from OR on room air with adequate spontaneous ventilation    Post pain: adequate analgesia    Post assessment: no apparent anesthetic complications and tolerated procedure well    Post vital signs: stable    Level of consciousness: awake, alert and oriented    Nausea/Vomiting: no nausea/vomiting    Complications: none    Transfer of care protocol was followed      Last vitals: Visit Vitals  /80 (BP Location: Left arm, Patient Position: Lying)   Pulse 77   Temp 37 °C (98.6 °F) (Temporal)   Resp 15   Ht 5' 8" (1.727 m)   Wt 68 kg (150 lb)   SpO2 (!) 94%   BMI 22.81 kg/m²     "

## 2024-09-23 NOTE — PLAN OF CARE
09/23/24 1755   Admission   Initial VN Admission Questions Complete   Communication Issues? Technical Issue  (VN used Intepreter Line Camera was offline)   Shift   Virtual Nurse - Rounding Complete     VN called into patient's room for introduction with  Line with  Jessica ID# [id number]  VN role explained and informed patient/family that VN would be working with bedside nurse and rest of care team.  Plan of care reviewed, pt encouraged to refer to whiteboard to determine staff for the day and pt/family educated on VTE,  fall risk and advised to call for assistance at all times to ensure pt's safety. Pt denies pain, nausea, requesting permission to shower, bedside nurse messaged.  Pt instructed to wait until bedside nurse returns to room, needing to clarify orders, pt verbalized understanding. Patient's chart, labs and vital signs reviewed.  Allowed time for questions.  Will continue to be available as needed.

## 2024-09-23 NOTE — ED NOTES
Utilizing  ([id number] - [place]) spoke with patient regarding NPO at midnight. Pt states he understands. Pt refused 9PM medications but requested something for heartburn. Advised provider of patient's request for medication.

## 2024-09-23 NOTE — ANESTHESIA POSTPROCEDURE EVALUATION
Anesthesia Post Evaluation    Patient: Oxana Nunez    Procedure(s) Performed: Procedure(s) (LRB):  EGD (ESOPHAGOGASTRODUODENOSCOPY) (N/A)    Final Anesthesia Type: general      Patient location during evaluation: GI PACU  Patient participation: Yes- Able to Participate  Level of consciousness: awake and alert and oriented  Post-procedure vital signs: reviewed and stable  Pain management: adequate  Airway patency: patent  ÓSCAR mitigation strategies: Multimodal analgesia  PONV status at discharge: No PONV  Anesthetic complications: no      Cardiovascular status: hemodynamically stable  Respiratory status: spontaneous ventilation and unassisted  Hydration status: euvolemic  Follow-up not needed.              Vitals Value Taken Time   /78 09/23/24 1542   Temp 36.9 °C (98.4 °F) 09/23/24 1542   Pulse 72 09/23/24 1542   Resp 13 09/23/24 1542   SpO2 96 % 09/23/24 1542         No case tracking events are documented in the log.      Pain/Brenden Score: Pain Rating Prior to Med Admin: 10 (9/22/2024 12:56 AM)  Pain Rating Post Med Admin: 0 (9/22/2024  8:04 PM)  Brenden Score: 10 (9/23/2024  3:45 PM)

## 2024-09-23 NOTE — PLAN OF CARE
Reported off to Simin  v/s  98.4  62  14  137/90  97%RA.  Patient denies present discomforts, NAD noted.  Patient will be transported to room  530  via stretcher.

## 2024-09-23 NOTE — PROGRESS NOTES
[facility] Emergency Dept  [facility]  Progress Note    Patient Name: Oxana Nunez  MRN: [id number]  Patient Class: OP- Observation   Admission Date: 9/21/2024  Length of Stay: 0 days  Attending Physician: Grace Al*  Primary Care Provider: Ashley, Primary Doctor        Subjective:     Principal Problem:Hematemesis        HPI:  Oxana Nunez is a 29M with no medical history who presented to [facility] ED w/ CC epigastric pain, vomiting, and constipation x4 days. He had a similar issue on 8/18/24 and 7/21/24 where he was seen and discharged both times from ED. He denies any urinary complaints or alcohol consumption. He does endorse fever, chills, and cough as well during the previous 4 days.   ED workup significant for: CO2 15, anion gap 19 --> 15, LA 2.7 --> 2.2, urine creatinine >450. Both CXR and CT A/P negative for acute processes.    He is admitted to Hospital Medicine with gastroenterology consult.      Overview/Hospital Course:  No notes on file    Interval History:  Awake, alert, report nausea.     Appreciate GI recs-possible EGD today.      Review of Systems   Constitutional:  Positive for appetite change. Negative for chills and fever.   Gastrointestinal:  Positive for abdominal pain. Negative for abdominal distention, blood in stool, constipation, nausea and vomiting.   Psychiatric/Behavioral:  Negative for agitation.      Objective:     Vital Signs (Most Recent):  Temp: 98.1 °F (36.7 °C) (09/21/24 1609)  Pulse: 99 (09/23/24 0634)  Resp: 16 (09/23/24 0634)  BP: (!) 154/73 (09/23/24 0634)  SpO2: 97 % (09/23/24 0634) Vital Signs (24h Range):  Pulse:  [70-99] 99  Resp:  [16] 16  SpO2:  [97 %-98 %] 97 %  BP: (124-154)/(73-91) 154/73     Weight: 68 kg (150 lb)  Body mass index is 22.81 kg/m².  No intake or output data in the 24 hours ending 09/23/24 0947        Physical Exam  Vitals and nursing note reviewed.   Constitutional:       General: He is not in acute distress.      "Appearance: He is not ill-appearing.      Comments: pain   HENT:      Head: Normocephalic and atraumatic.   Cardiovascular:      Rate and Rhythm: Normal rate and regular rhythm.      Pulses: Normal pulses.      Heart sounds: Normal heart sounds. No murmur heard.     No friction rub. No gallop.   Pulmonary:      Effort: Pulmonary effort is normal. No respiratory distress.      Breath sounds: Normal breath sounds. No rhonchi.   Abdominal:      General: There is no distension.      Palpations: Abdomen is soft.      Tenderness: There is abdominal tenderness in the epigastric area. There is no guarding or rebound.   Musculoskeletal:         General: Normal range of motion.      Cervical back: Normal range of motion and neck supple.      Right lower leg: No edema.      Left lower leg: No edema.   Skin:     General: Skin is warm and dry.      Capillary Refill: Capillary refill takes less than 2 seconds.   Neurological:      General: No focal deficit present.      Mental Status: He is alert and oriented to person, place, and time.   Psychiatric:         Mood and Affect: Mood normal.         Behavior: Behavior normal.             Significant Labs: A1C: No results for input(s): "HGBA1C" in the last 4320 hours.  ABGs: No results for input(s): "PH", "PCO2", "HCO3", "POCSATURATED", "BE", "TOTALHB", "COHB", "METHB", "O2HB", "POCFIO2", "PO2" in the last 48 hours.  Blood Culture: No results for input(s): "LABBLOO" in the last 48 hours.  CBC:   Recent Labs   Lab 09/21/24  1652 09/22/24  0747 09/23/24  0645   WBC 8.47 11.80 8.44   HGB 17.5 15.7 16.4   HCT 48.6 44.8 46.4    333 336     CMP:   Recent Labs   Lab 09/21/24  1652 09/21/24  2035 09/22/24  0746 09/23/24  0645    141 142 137   K 3.7 3.9 3.2* 3.7    111* 106 103   CO2 15* 15* 22* 21*   * 117* 86 95   BUN 12 10 11 14   CREATININE 1.0 0.9 0.8 0.9   CALCIUM 10.4 9.0 9.5 9.8   PROT 8.3  --   --   --    ALBUMIN 4.8  --   --   --    BILITOT 0.8  --   --   " "--    ALKPHOS 89  --   --   --    AST 16  --   --   --    ALT 31  --   --   --    ANIONGAP 19* 15 14 13     Lactic Acid:   Recent Labs   Lab 09/21/24  1652 09/21/24  2035   LACTATE 2.7* 2.2     Lipase:   Recent Labs   Lab 09/21/24  1652   LIPASE 20     Lipid Panel: No results for input(s): "CHOL", "HDL", "LDLCALC", "TRIG", "CHOLHDL" in the last 48 hours.  Magnesium:   Recent Labs   Lab 09/22/24  0746 09/23/24  0645   MG 2.1 2.0     Troponin: No results for input(s): "TROPONINI", "TROPONINIHS" in the last 48 hours.  TSH: No results for input(s): "TSH" in the last 4320 hours.  Urine Culture: No results for input(s): "LABURIN" in the last 48 hours.  Urine Studies:   Recent Labs   Lab 09/21/24  1808   COLORU Yellow   APPEARANCEUA Clear   PHUR 8.0   SPECGRAV >1.030*   PROTEINUA 1+*   GLUCUA Negative   KETONESU 3+*   BILIRUBINUA Negative   OCCULTUA Negative   NITRITE Negative   UROBILINOGEN 2.0-3.0*   LEUKOCYTESUR Negative   RBCUA 4   WBCUA 2   BACTERIA Rare   HYALINECASTS None       Significant Imaging: I have reviewed all pertinent imaging results/findings within the past 24 hours.    Assessment/Plan:      * Hematemesis  Patient has acute blood loss due to hemorrhage, the hemorrhage is due to gastrointestinal bleed, patient does not have a propensity for bleeding.. Will trend hemoglobin/hematocrit Every 6 hours, as well as monitor and correct for any coagulation defects. CBC and vital signs have been reviewed and last CBC was noted-   Lab Results   Component Value Date    WBC 8.44 09/23/2024    HGB 16.4 09/23/2024    HCT 46.4 09/23/2024    MCV 89 09/23/2024     09/23/2024         Will order a type and screen and consent patient for blood transfusion. Will transfuse if Hgb is <7g/dl (<8g/dl in cases of active ACS) or if patient has rapid bleeding leading to hemodynamic instability.    NPO-clear liquid diet today and NPO after midnight  GI consulted-appreciate rec EGD today.  PPI started  PRN pain and nausea " control    Hypokalemia  Patient's most recent potassium results are listed below.   Recent Labs     09/21/24  1652 09/21/24 2035 09/22/24  0746   K 3.7 3.9 3.2*     Plan  - Replete potassium per protocol  - Monitor potassium Daily  - Patient's hypokalemia is stable  -     Epigastric pain  See plan for hematemesis        VTE Risk Mitigation (From admission, onward)           Ordered     IP VTE LOW RISK PATIENT  Once         09/21/24 2035     Place sequential compression device  Until discontinued         09/21/24 2035                    Discharge Planning   ONOFRE:      Code Status: Full Code   Is the patient medically ready for discharge?:     Reason for patient still in hospital (select all that apply): Patient trending condition                     Grace Al MD  Department of Hospital Medicine   [place] - Emergency Dept

## 2024-09-23 NOTE — PLAN OF CARE
EGD completed, please see endoscopy report. Remarkable for severe (grade D) esophagitis of lower 1/3 of the esophagus. Biopsied.     Recs:   advance diet as tolerated  BID PPI x 8 weeks  Will need repeat EGD in 8-12 weeks for BE surveillance    Zenia Dickerson DO   [employer] GI Fellow PGY IV

## 2024-09-24 VITALS
DIASTOLIC BLOOD PRESSURE: 86 MMHG | BODY MASS INDEX: 23.36 KG/M2 | HEIGHT: 68 IN | SYSTOLIC BLOOD PRESSURE: 117 MMHG | OXYGEN SATURATION: 95 % | TEMPERATURE: 99 F | RESPIRATION RATE: 18 BRPM | WEIGHT: 154.13 LBS | HEART RATE: 90 BPM

## 2024-09-24 PROBLEM — K20.90 ESOPHAGITIS DETERMINED BY ENDOSCOPY: Status: ACTIVE | Noted: 2024-09-24

## 2024-09-24 LAB
ANION GAP SERPL CALC-SCNC: 12 MMOL/L (ref 8–16)
BASOPHILS # BLD AUTO: 0.04 K/UL (ref 0–0.2)
BASOPHILS # BLD AUTO: 0.04 K/UL (ref 0–0.2)
BASOPHILS NFR BLD: 0.4 % (ref 0–1.9)
BASOPHILS NFR BLD: 0.5 % (ref 0–1.9)
BUN SERPL-MCNC: 14 MG/DL (ref 6–20)
CALCIUM SERPL-MCNC: 9.7 MG/DL (ref 8.7–10.5)
CHLORIDE SERPL-SCNC: 104 MMOL/L (ref 95–110)
CO2 SERPL-SCNC: 22 MMOL/L (ref 23–29)
CREAT SERPL-MCNC: 0.9 MG/DL (ref 0.5–1.4)
DIFFERENTIAL METHOD BLD: ABNORMAL
DIFFERENTIAL METHOD BLD: ABNORMAL
EOSINOPHIL # BLD AUTO: 0 K/UL (ref 0–0.5)
EOSINOPHIL # BLD AUTO: 0 K/UL (ref 0–0.5)
EOSINOPHIL NFR BLD: 0 % (ref 0–8)
EOSINOPHIL NFR BLD: 0.3 % (ref 0–8)
ERYTHROCYTE [DISTWIDTH] IN BLOOD BY AUTOMATED COUNT: 11.9 % (ref 11.5–14.5)
ERYTHROCYTE [DISTWIDTH] IN BLOOD BY AUTOMATED COUNT: 11.9 % (ref 11.5–14.5)
EST. GFR  (NO RACE VARIABLE): >60 ML/MIN/1.73 M^2
GLUCOSE SERPL-MCNC: 75 MG/DL (ref 70–110)
HCT VFR BLD AUTO: 48.1 % (ref 40–54)
HCT VFR BLD AUTO: 48.2 % (ref 40–54)
HGB BLD-MCNC: 16.9 G/DL (ref 14–18)
HGB BLD-MCNC: 17 G/DL (ref 14–18)
HIV 1+2 AB+HIV1 P24 AG SERPL QL IA: NORMAL
IMM GRANULOCYTES # BLD AUTO: 0.03 K/UL (ref 0–0.04)
IMM GRANULOCYTES # BLD AUTO: 0.04 K/UL (ref 0–0.04)
IMM GRANULOCYTES NFR BLD AUTO: 0.4 % (ref 0–0.5)
IMM GRANULOCYTES NFR BLD AUTO: 0.4 % (ref 0–0.5)
LYMPHOCYTES # BLD AUTO: 1.9 K/UL (ref 1–4.8)
LYMPHOCYTES # BLD AUTO: 2.7 K/UL (ref 1–4.8)
LYMPHOCYTES NFR BLD: 20.9 % (ref 18–48)
LYMPHOCYTES NFR BLD: 33.7 % (ref 18–48)
MAGNESIUM SERPL-MCNC: 2.2 MG/DL (ref 1.6–2.6)
MCH RBC QN AUTO: 31.3 PG (ref 27–31)
MCH RBC QN AUTO: 31.4 PG (ref 27–31)
MCHC RBC AUTO-ENTMCNC: 35.1 G/DL (ref 32–36)
MCHC RBC AUTO-ENTMCNC: 35.3 G/DL (ref 32–36)
MCV RBC AUTO: 88 FL (ref 82–98)
MCV RBC AUTO: 89 FL (ref 82–98)
MONOCYTES # BLD AUTO: 0.4 K/UL (ref 0.3–1)
MONOCYTES # BLD AUTO: 0.7 K/UL (ref 0.3–1)
MONOCYTES NFR BLD: 4.7 % (ref 4–15)
MONOCYTES NFR BLD: 8.5 % (ref 4–15)
NEUTROPHILS # BLD AUTO: 4.5 K/UL (ref 1.8–7.7)
NEUTROPHILS # BLD AUTO: 6.6 K/UL (ref 1.8–7.7)
NEUTROPHILS NFR BLD: 56.6 % (ref 38–73)
NEUTROPHILS NFR BLD: 73.6 % (ref 38–73)
NRBC BLD-RTO: 0 /100 WBC
NRBC BLD-RTO: 0 /100 WBC
PHOSPHATE SERPL-MCNC: 3.6 MG/DL (ref 2.7–4.5)
PLATELET # BLD AUTO: 332 K/UL (ref 150–450)
PLATELET # BLD AUTO: 338 K/UL (ref 150–450)
PMV BLD AUTO: 10.1 FL (ref 9.2–12.9)
PMV BLD AUTO: 9.7 FL (ref 9.2–12.9)
POTASSIUM SERPL-SCNC: 3.7 MMOL/L (ref 3.5–5.1)
RBC # BLD AUTO: 5.39 M/UL (ref 4.6–6.2)
RBC # BLD AUTO: 5.44 M/UL (ref 4.6–6.2)
SODIUM SERPL-SCNC: 138 MMOL/L (ref 136–145)
WBC # BLD AUTO: 7.87 K/UL (ref 3.9–12.7)
WBC # BLD AUTO: 8.99 K/UL (ref 3.9–12.7)

## 2024-09-24 PROCEDURE — 85025 COMPLETE CBC W/AUTO DIFF WBC: CPT | Performed by: FAMILY MEDICINE

## 2024-09-24 PROCEDURE — 36415 COLL VENOUS BLD VENIPUNCTURE: CPT | Performed by: FAMILY MEDICINE

## 2024-09-24 PROCEDURE — 84100 ASSAY OF PHOSPHORUS: CPT

## 2024-09-24 PROCEDURE — 99232 SBSQ HOSP IP/OBS MODERATE 35: CPT | Mod: ,,, | Performed by: INTERNAL MEDICINE

## 2024-09-24 PROCEDURE — 36415 COLL VENOUS BLD VENIPUNCTURE: CPT

## 2024-09-24 PROCEDURE — 63600175 PHARM REV CODE 636 W HCPCS: Performed by: FAMILY MEDICINE

## 2024-09-24 PROCEDURE — 83735 ASSAY OF MAGNESIUM: CPT

## 2024-09-24 PROCEDURE — 80048 BASIC METABOLIC PNL TOTAL CA: CPT

## 2024-09-24 RX ORDER — PANTOPRAZOLE SODIUM 40 MG/1
40 TABLET, DELAYED RELEASE ORAL 2 TIMES DAILY
Qty: 60 TABLET | Refills: 1 | Status: SHIPPED | OUTPATIENT
Start: 2024-09-24 | End: 2024-09-24

## 2024-09-24 RX ORDER — ONDANSETRON 4 MG/1
8 TABLET, ORALLY DISINTEGRATING ORAL EVERY 6 HOURS PRN
Qty: 20 TABLET | Refills: 0 | Status: SHIPPED | OUTPATIENT
Start: 2024-09-24

## 2024-09-24 RX ORDER — PANTOPRAZOLE SODIUM 40 MG/1
40 TABLET, DELAYED RELEASE ORAL 2 TIMES DAILY
Qty: 60 TABLET | Refills: 1 | Status: SHIPPED | OUTPATIENT
Start: 2024-09-24 | End: 2024-11-19

## 2024-09-24 RX ADMIN — PANTOPRAZOLE SODIUM 40 MG: 40 INJECTION, POWDER, LYOPHILIZED, FOR SOLUTION INTRAVENOUS at 09:09

## 2024-09-24 NOTE — PLAN OF CARE
Problem: Adult Inpatient Plan of Care  Goal: Plan of Care Review  Outcome: Progressing  Goal: Patient-Specific Goal (Individualized)  Outcome: Progressing  Goal: Optimal Comfort and Wellbeing  Outcome: Progressing     Problem: Gastrointestinal Bleeding  Goal: Optimal Coping with Acute Illness  Outcome: Progressing     Problem: Pain Acute  Goal: Optimal Pain Control and Function  Outcome: Progressing     Problem: Nausea and Vomiting  Goal: Nausea and Vomiting Relief  Outcome: Progressing

## 2024-09-24 NOTE — PLAN OF CARE
VN reviewed discharge instructions with pt. Using  # [id number]. Using teach back method.  AVS printed and handed to pt by bedside nurse.  Reviewed follow-up appointments, medications, diet, and importance of medication compliance.  Reviewed home care instructions, treatment plan, self-management, and when to seek medical attention.  Allowed time for questions.  All questions answered.  Patient verbalized complete understanding of discharge instructions and voices no concerns.     Discharge instructions complete.  Pt waiting on Bedside delivery .  Bedside nurse notified.

## 2024-09-24 NOTE — ASSESSMENT & PLAN NOTE
With associated abdominal pain.  By history sounds like he may have had a Lisa-Haney tear.  Fortunately hemoglobin relatively stable   EGD 9/23/24 with significant esophagitis. Biopsies taken  Continue Protonix 40 mg PO BID x 8 weeks  Repeat EGD in 8 weeks to document healing/screen for BE  Await pathology  Advance diet

## 2024-09-24 NOTE — PLAN OF CARE
[facility] - Med Surg  Initial Discharge Assessment       Primary Care Provider: No, Primary Doctor    Admission Diagnosis: Hematemesis [K92.0]  Epigastric pain [R10.13]  Chest pain [R07.9]    Admission Date: 9/21/2024  Expected Discharge Date: 9/24/2024    Transition of Care Barriers: (P) Unisured    Payor: /     Extended Emergency Contact Information  Primary Emergency Contact: Jax Barreto  Mobile Phone: [phone]  Relation: Brother  Preferred language: Kazakh   needed? Yes    Discharge Plan A: (P) Home with family         [id number] - [place] - [address] AT [facility] [address]  [address] [address]  Phone: [phone] Fax: [phone]      Initial Assessment (most recent)       Adult Discharge Assessment - 09/24/24 1415          Discharge Assessment    Assessment Type Discharge Planning Assessment     Confirmed/corrected address, phone number and insurance Yes (P)      Confirmed Demographics Correct on Facesheet (P)      Source of Information patient; utilized (P)    [facility] #[id number] Stacie    Communicated ONOFRE with patient/caregiver Yes (P)      People in Home other relative(s);parent(s) (P)      Do you expect to return to your current living situation? Yes (P)      Do you have help at home or someone to help you manage your care at home? No (P)      Prior to hospitilization cognitive status: No Deficits;Alert/Oriented (P)      Current cognitive status: Alert/Oriented;No Deficits (P)      Walking or Climbing Stairs Difficulty no (P)      Dressing/Bathing Difficulty no (P)      Equipment Currently Used at Home none (P)      Readmission within 30 days? No (P)      Patient currently being followed by outpatient case management? No (P)      Do you currently have service(s) that help you manage your care at home? No (P)      Do you take prescription medications? No (P)      Do you have prescription coverage? No (P)      Do you have any  problems affording any of your prescribed medications? TBD (P)      Who is going to help you get home at discharge? Family (P)      How do you get to doctors appointments? family or friend will provide;car, drives self (P)      Are you on dialysis? No (P)      Do you take coumadin? No (P)      Discharge Plan A Home with family (P)      DME Needed Upon Discharge  none (P)      Discharge Plan discussed with: Patient (P)      Transition of Care Barriers Unisured (P)         Physical Activity    On average, how many days per week do you engage in moderate to strenuous exercise (like a brisk walk)? 0 days (P)      On average, how many minutes do you engage in exercise at this level? 0 min (P)         Financial Resource Strain    How hard is it for you to pay for the very basics like food, housing, medical care, and heating? Not hard at all (P)         Housing Stability    In the last 12 months, was there a time when you were not able to pay the mortgage or rent on time? No (P)      At any time in the past 12 months, were you homeless or living in a shelter (including now)? No (P)         Transportation Needs    Has the lack of transportation kept you from medical appointments, meetings, work or from getting things needed for daily living? No (P)         Food Insecurity    Within the past 12 months, you worried that your food would run out before you got the money to buy more. Never true (P)      Within the past 12 months, the food you bought just didn't last and you didn't have money to get more. Never true (P)         Stress    Do you feel stress - tense, restless, nervous, or anxious, or unable to sleep at night because your mind is troubled all the time - these days? Not at all (P)         Social Isolation    How often do you feel lonely or isolated from those around you?  Never (P)         ContactUs.comities    In the past 12 months has the electric, gas, oil, or water company threatened to shut off services in your home? No  (P)         Health Literacy    How often do you need to have someone help you when you read instructions, pamphlets, or other written material from your doctor or pharmacy? Never (P)                       No future appointments.   Follow-up Information       Center, [facility]. Go in 1 week(s).    Specialties: Behavioral Health, Psychiatry  Why: DISCHARGE FOLLOW UP WITH [facility] PCP, FOLLOW UP WITH GASTROENTEROLOGY  Contact information:  [address] [address]  [phone]               Gastroenterology, [facility] -. Schedule an appointment as soon as possible for a visit.    Specialty: Gastroenterology  Why: As needed, FOLLOW UP WITH GASTROENTEROLOGY, OFFICE TO CALL PATIENT/FAMILY TO SET UP APPT TIME, CLINICALS FAXED TO OFFICE, If date/time not listed,  will contact  Contact information:  [address] [facility] [place] [address]  [phone]                           Orders Placed This Encounter   Procedures    Ambulatory referral/consult to Gastroenterology     Standing Status:   Future     Standing Expiration Date:   10/24/2025     Referral Priority:   Routine     Referral Type:   Consultation     Referral Reason:   Specialty Services Required     Requested Specialty:   Gastroenterology     Number of Visits Requested:   1

## 2024-09-24 NOTE — PLAN OF CARE
09/24/24 1406   Post-Acute Status   Post-Acute Authorization Other   Other Status See Comments   Discharge Delays None known at this time   Discharge Plan   Discharge Plan A Home with family        Follow-up Information       Center, [facility]. Go in 1 week(s).    Specialties: Behavioral Health, Psychiatry  Why: DISCHARGE FOLLOW UP WITH COMMUNITY PCP, FOLLOW UP WITH GASTROENTEROLOGY  Contact information:  [address] [id number] [phone]                           No orders of the defined types were placed in this encounter.

## 2024-09-24 NOTE — PROGRESS NOTES
[facility] Surg  Gastroenterology  Progress Note    Patient Name: Oxana Nunez  MRN: [id number]  Admission Date: 9/21/2024  Hospital Length of Stay: 1 days  Code Status: Full Code   Attending Provider: No att. providers found  Consulting Provider: Garret Randle MD  Primary Care Physician: No, Primary Doctor  Principal Problem: Hematemesis        Subjective:     Interval History: S/p EGD yesterday with significant esophagitis. Path pending. Today reports feeling better. Tolerating diet    Review of Systems   Gastrointestinal:  Positive for abdominal distention, abdominal pain, nausea and vomiting.     Objective:     Vital Signs (Most Recent):  Temp: 98.8 °F (37.1 °C) (09/24/24 1122)  Pulse: 90 (09/24/24 1248)  Resp: 18 (09/24/24 1122)  BP: 117/86 (09/24/24 1122)  SpO2: 95 % (09/24/24 1122) Vital Signs (24h Range):  Temp:  [97.9 °F (36.6 °C)-98.8 °F (37.1 °C)] 98.8 °F (37.1 °C)  Pulse:  [62-90] 90  Resp:  [14-20] 18  SpO2:  [95 %-99 %] 95 %  BP: (107-137)/(63-90) 117/86     Weight: 69.9 kg (154 lb 1.6 oz) (09/24/24 0335)  Body mass index is 23.43 kg/m².      Intake/Output Summary (Last 24 hours) at 9/24/2024 1603  Last data filed at 9/24/2024 1325  Gross per 24 hour   Intake 120 ml   Output --   Net 120 ml       Lines/Drains/Airways       Peripheral Intravenous Line  Duration                  Peripheral IV - Single Lumen 09/23/24 1851 20 G Anterior;Left;Proximal Forearm <1 day                     Physical Exam  Constitutional:       Appearance: He is well-developed.   HENT:      Head: Normocephalic and atraumatic.   Eyes:      Conjunctiva/sclera: Conjunctivae normal.   Pulmonary:      Effort: Pulmonary effort is normal. No respiratory distress.   Neurological:      Mental Status: He is alert and oriented to person, place, and time.   Psychiatric:         Behavior: Behavior normal.         Thought Content: Thought content normal.         Judgment: Judgment normal.          Significant Labs:  Recent  Lab Results         09/24/24  0811   09/24/24  0439   09/23/24  1903   09/23/24  1750        Anion Gap   12           Baso # 0.04   0.04     0.03       Basophil % 0.4   0.5     0.4       BUN   14           Calcium   9.7           Chloride   104           CO2   22           Creatinine   0.9           Differential Method Automated   Automated     Automated       eGFR   >60           Eos # 0.0   0.0     0.0       Eos % 0.0   0.3     0.1       Glucose   75           Gran # (ANC) 6.6   4.5     6.4       Gran % 73.6   56.6     74.4       Hematocrit 48.1   48.2     46.6       Hemoglobin 17.0   16.9     16.4       HIV 1/2 Ag/Ab     Non-reactive         Immature Grans (Abs) 0.04  Comment: Mild elevation in immature granulocytes is non specific and   can be seen in a variety of conditions including stress response,   acute inflammation, trauma and pregnancy. Correlation with other   laboratory and clinical findings is essential.     0.03  Comment: Mild elevation in immature granulocytes is non specific and   can be seen in a variety of conditions including stress response,   acute inflammation, trauma and pregnancy. Correlation with other   laboratory and clinical findings is essential.       0.04  Comment: Mild elevation in immature granulocytes is non specific and   can be seen in a variety of conditions including stress response,   acute inflammation, trauma and pregnancy. Correlation with other   laboratory and clinical findings is essential.         Immature Granulocytes 0.4   0.4     0.5       Lymph # 1.9   2.7     1.6       Lymph % 20.9   33.7     18.2       Magnesium    2.2           MCH 31.3   31.4     30.9       MCHC 35.3   35.1     35.2       MCV 88   89     88       Mono # 0.4   0.7     0.6       Mono % 4.7   8.5     6.4       MPV 9.7   10.1     9.8       nRBC 0   0     0       Phosphorus Level   3.6           Platelet Count 332   338     328       Potassium   3.7           RBC 5.44   5.39     5.30       RDW  11.9   11.9     11.8       Sodium   138           WBC 8.99   7.87     8.55                 Significant Imaging:  Imaging results within the past 24 hours have been reviewed.  Assessment/Plan:     GI  * Hematemesis  With associated abdominal pain.  By history sounds like he may have had a Lisa-Haney tear.  Fortunately hemoglobin relatively stable   EGD 9/23/24 with significant esophagitis. Biopsies taken  Continue Protonix 40 mg PO BID x 8 weeks  Repeat EGD in 8 weeks to document healing/screen for BE  Await pathology  Advance diet        Thank you for your consult. I will sign off. Please contact us if you have any additional questions.    Garret Randle MD  Gastroenterology  [facility]

## 2024-09-24 NOTE — SUBJECTIVE & OBJECTIVE
Subjective:     Interval History: S/p EGD yesterday with significant esophagitis. Path pending. Today reports feeling better. Tolerating diet    Review of Systems   Gastrointestinal:  Positive for abdominal distention, abdominal pain, nausea and vomiting.     Objective:     Vital Signs (Most Recent):  Temp: 98.8 °F (37.1 °C) (09/24/24 1122)  Pulse: 90 (09/24/24 1248)  Resp: 18 (09/24/24 1122)  BP: 117/86 (09/24/24 1122)  SpO2: 95 % (09/24/24 1122) Vital Signs (24h Range):  Temp:  [97.9 °F (36.6 °C)-98.8 °F (37.1 °C)] 98.8 °F (37.1 °C)  Pulse:  [62-90] 90  Resp:  [14-20] 18  SpO2:  [95 %-99 %] 95 %  BP: (107-137)/(63-90) 117/86     Weight: 69.9 kg (154 lb 1.6 oz) (09/24/24 0335)  Body mass index is 23.43 kg/m².      Intake/Output Summary (Last 24 hours) at 9/24/2024 1603  Last data filed at 9/24/2024 1325  Gross per 24 hour   Intake 120 ml   Output --   Net 120 ml       Lines/Drains/Airways       Peripheral Intravenous Line  Duration                  Peripheral IV - Single Lumen 09/23/24 1851 20 G Anterior;Left;Proximal Forearm <1 day                     Physical Exam  Constitutional:       Appearance: He is well-developed.   HENT:      Head: Normocephalic and atraumatic.   Eyes:      Conjunctiva/sclera: Conjunctivae normal.   Pulmonary:      Effort: Pulmonary effort is normal. No respiratory distress.   Neurological:      Mental Status: He is alert and oriented to person, place, and time.   Psychiatric:         Behavior: Behavior normal.         Thought Content: Thought content normal.         Judgment: Judgment normal.          Significant Labs:  Recent Lab Results         09/24/24  0811   09/24/24  0439   09/23/24  1903   09/23/24  1750        Anion Gap   12           Baso # 0.04   0.04     0.03       Basophil % 0.4   0.5     0.4       BUN   14           Calcium   9.7           Chloride   104           CO2   22           Creatinine   0.9           Differential Method Automated   Automated     Automated        eGFR   >60           Eos # 0.0   0.0     0.0       Eos % 0.0   0.3     0.1       Glucose   75           Gran # (ANC) 6.6   4.5     6.4       Gran % 73.6   56.6     74.4       Hematocrit 48.1   48.2     46.6       Hemoglobin 17.0   16.9     16.4       HIV 1/2 Ag/Ab     Non-reactive         Immature Grans (Abs) 0.04  Comment: Mild elevation in immature granulocytes is non specific and   can be seen in a variety of conditions including stress response,   acute inflammation, trauma and pregnancy. Correlation with other   laboratory and clinical findings is essential.     0.03  Comment: Mild elevation in immature granulocytes is non specific and   can be seen in a variety of conditions including stress response,   acute inflammation, trauma and pregnancy. Correlation with other   laboratory and clinical findings is essential.       0.04  Comment: Mild elevation in immature granulocytes is non specific and   can be seen in a variety of conditions including stress response,   acute inflammation, trauma and pregnancy. Correlation with other   laboratory and clinical findings is essential.         Immature Granulocytes 0.4   0.4     0.5       Lymph # 1.9   2.7     1.6       Lymph % 20.9   33.7     18.2       Magnesium    2.2           MCH 31.3   31.4     30.9       MCHC 35.3   35.1     35.2       MCV 88   89     88       Mono # 0.4   0.7     0.6       Mono % 4.7   8.5     6.4       MPV 9.7   10.1     9.8       nRBC 0   0     0       Phosphorus Level   3.6           Platelet Count 332   338     328       Potassium   3.7           RBC 5.44   5.39     5.30       RDW 11.9   11.9     11.8       Sodium   138           WBC 8.99   7.87     8.55                 Significant Imaging:  Imaging results within the past 24 hours have been reviewed.

## 2024-09-24 NOTE — NURSING
Dawit used at bedside for pt assessment and nursing rounding. Pt denies any pain or nausea. Pt requesting jello, jello x2. Pt denies any needs at this time. Pt instructed to call w/any needs, verbalized understanding. Bed in lowest position, locked. Call light w/in pt's reach.

## 2024-09-24 NOTE — PLAN OF CARE
[facility] - Med Surg  Discharge Final Note    Primary Care Provider: No, Primary Doctor    Expected Discharge Date: 9/24/2024    Pharmacist will go over home medications and reasons for medications. VN and bedside nurse to reiterate final discharge instructions.     Cleared from CM . Bedside Nurse and VN notified.    DC plan as listed below in CM flowsheet.    Pt drove to hospital and will drive home .   Final Discharge Note (most recent)       Final Note - 09/24/24 1430          Final Note    Assessment Type Final Discharge Note (P)      Anticipated Discharge Disposition Home or Self Care (P)      Hospital Resources/Appts/Education Provided Appointments scheduled and added to AVS (P)         Post-Acute Status    Post-Acute Authorization Other (P)      Other Status Awaiting f/u Appts (P)    F/U with [facility] and [facility] Gastro requested.    Discharge Delays None known at this time (P)                    No future appointments.      Contact Info       [facility]   Specialty: Behavioral Health, Psychiatry    [address]  [place] [address]   Phone: [phone]       Next Steps: Go in 1 week(s)    Instructions: DISCHARGE FOLLOW UP WITH COMMUNITY PCP, FOLLOW UP WITH GASTROENTEROLOGY    [facility] - Gastroenterology   Specialty: Gastroenterology    [address] [facility] [address]   Phone: [phone]       Next Steps: Schedule an appointment as soon as possible for a visit    Instructions: As needed, FOLLOW UP WITH GASTROENTEROLOGY, OFFICE TO CALL PATIENT/FAMILY TO SET UP APPT TIME, CLINICALS FAXED TO OFFICE, If date/time not listed,  will contact          Orders Placed This Encounter   Procedures    Ambulatory referral/consult to Gastroenterology     Standing Status:   Future     Standing Expiration Date:   10/24/2025     Referral Priority:   Routine     Referral Type:   Consultation     Referral Reason:   Specialty Services Required     Requested Specialty:    Gastroenterology     Number of Visits Requested:   1

## 2024-09-24 NOTE — PLAN OF CARE
VIRTUAL NURSE:  Labs, notes, orders, and careplan reviewed.   VN to be available as needed.         Problem: Adult Inpatient Plan of Care  Goal: Plan of Care Review  Outcome: Progressing  Goal: Patient-Specific Goal (Individualized)  Outcome: Progressing

## 2024-09-24 NOTE — CONSULTS
Thank you for your consult to [facility]. We have reviewed the patient chart. This patient does meet criteria for [facility] service at this time.  Will assume care on 09/24/24 at 7AM.    Christine Mcmahon MD

## 2024-09-26 LAB
FINAL PATHOLOGIC DIAGNOSIS: NORMAL
GROSS: NORMAL
Lab: NORMAL

## 2024-10-29 ENCOUNTER — HOSPITAL ENCOUNTER (EMERGENCY)
Facility: HOSPITAL | Age: 29
Discharge: HOME | End: 2024-10-30
Attending: STUDENT IN AN ORGANIZED HEALTH CARE EDUCATION/TRAINING PROGRAM

## 2024-10-29 VITALS
TEMPERATURE: 99 F | RESPIRATION RATE: 22 BRPM | SYSTOLIC BLOOD PRESSURE: 163 MMHG | DIASTOLIC BLOOD PRESSURE: 87 MMHG | HEART RATE: 110 BPM | OXYGEN SATURATION: 100 %

## 2024-10-29 DIAGNOSIS — R10.9 ABDOMINAL PAIN, UNSPECIFIED ABDOMINAL LOCATION: Primary | ICD-10-CM

## 2024-10-29 DIAGNOSIS — K92.0 HEMATEMESIS: ICD-10-CM

## 2024-10-29 DIAGNOSIS — K22.6 MALLORY-WEISS TEAR: ICD-10-CM

## 2024-10-29 PROCEDURE — 86140 C-REACTIVE PROTEIN: CPT | Performed by: STUDENT IN AN ORGANIZED HEALTH CARE EDUCATION/TRAINING PROGRAM

## 2024-10-29 PROCEDURE — 96375 TX/PRO/DX INJ NEW DRUG ADDON: CPT

## 2024-10-29 PROCEDURE — 85025 COMPLETE CBC W/AUTO DIFF WBC: CPT | Performed by: STUDENT IN AN ORGANIZED HEALTH CARE EDUCATION/TRAINING PROGRAM

## 2024-10-29 PROCEDURE — 83690 ASSAY OF LIPASE: CPT | Performed by: STUDENT IN AN ORGANIZED HEALTH CARE EDUCATION/TRAINING PROGRAM

## 2024-10-29 PROCEDURE — 96374 THER/PROPH/DIAG INJ IV PUSH: CPT

## 2024-10-29 PROCEDURE — 80053 COMPREHEN METABOLIC PANEL: CPT | Performed by: STUDENT IN AN ORGANIZED HEALTH CARE EDUCATION/TRAINING PROGRAM

## 2024-10-29 PROCEDURE — 99285 EMERGENCY DEPT VISIT HI MDM: CPT | Mod: 25

## 2024-10-29 RX ORDER — ONDANSETRON HYDROCHLORIDE 2 MG/ML
8 INJECTION, SOLUTION INTRAVENOUS ONCE
Status: COMPLETED | OUTPATIENT
Start: 2024-10-30 | End: 2024-10-29

## 2024-10-29 RX ORDER — FAMOTIDINE 10 MG/ML
40 INJECTION INTRAVENOUS ONCE
Status: COMPLETED | OUTPATIENT
Start: 2024-10-30 | End: 2024-10-29

## 2024-10-29 RX ADMIN — FAMOTIDINE 40 MG: 10 INJECTION INTRAVENOUS at 11:10

## 2024-10-29 RX ADMIN — ONDANSETRON 8 MG: 2 INJECTION INTRAMUSCULAR; INTRAVENOUS at 11:10

## 2024-10-29 NOTE — Clinical Note
"Oxana "Oxana" Iglesia Nunez was seen and treated in our emergency department on 10/29/2024.  He may return to work on 11/01/2024.       If you have any questions or concerns, please don't hesitate to call.      Kane Manzo MD"

## 2024-10-30 LAB
ALBUMIN SERPL BCP-MCNC: 5 G/DL (ref 3.5–5.2)
ALP SERPL-CCNC: 95 U/L (ref 40–150)
ALT SERPL W/O P-5'-P-CCNC: 43 U/L (ref 10–44)
AMPHET+METHAMPHET UR QL: NEGATIVE
ANION GAP SERPL CALC-SCNC: 18 MMOL/L (ref 8–16)
AST SERPL-CCNC: 19 U/L (ref 10–40)
BACTERIA #/AREA URNS HPF: NORMAL /HPF
BARBITURATES UR QL SCN>200 NG/ML: NEGATIVE
BASOPHILS # BLD AUTO: 0.05 K/UL (ref 0–0.2)
BASOPHILS NFR BLD: 0.4 % (ref 0–1.9)
BENZODIAZ UR QL SCN>200 NG/ML: NEGATIVE
BILIRUB SERPL-MCNC: 0.9 MG/DL (ref 0.1–1)
BILIRUB UR QL STRIP: NEGATIVE
BUN SERPL-MCNC: 15 MG/DL (ref 6–20)
BZE UR QL SCN: NEGATIVE
CALCIUM SERPL-MCNC: 10.6 MG/DL (ref 8.7–10.5)
CANNABINOIDS UR QL SCN: NEGATIVE
CHLORIDE SERPL-SCNC: 106 MMOL/L (ref 95–110)
CLARITY UR: CLEAR
CO2 SERPL-SCNC: 18 MMOL/L (ref 23–29)
COLOR UR: YELLOW
CREAT SERPL-MCNC: 1.1 MG/DL (ref 0.5–1.4)
CREAT UR-MCNC: 330 MG/DL (ref 23–375)
CRP SERPL-MCNC: 0.6 MG/L (ref 0–8.2)
DIFFERENTIAL METHOD BLD: ABNORMAL
EOSINOPHIL # BLD AUTO: 0 K/UL (ref 0–0.5)
EOSINOPHIL NFR BLD: 0 % (ref 0–8)
ERYTHROCYTE [DISTWIDTH] IN BLOOD BY AUTOMATED COUNT: 11.7 % (ref 11.5–14.5)
EST. GFR  (NO RACE VARIABLE): >60 ML/MIN/1.73 M^2
GLUCOSE SERPL-MCNC: 131 MG/DL (ref 70–110)
GLUCOSE UR QL STRIP: NEGATIVE
HCT VFR BLD AUTO: 50.7 % (ref 40–54)
HGB BLD-MCNC: 18.4 G/DL (ref 14–18)
HGB UR QL STRIP: NEGATIVE
HYALINE CASTS #/AREA URNS LPF: 0 /LPF
IMM GRANULOCYTES # BLD AUTO: 0.05 K/UL (ref 0–0.04)
IMM GRANULOCYTES NFR BLD AUTO: 0.4 % (ref 0–0.5)
KETONES UR QL STRIP: ABNORMAL
LEUKOCYTE ESTERASE UR QL STRIP: NEGATIVE
LIPASE SERPL-CCNC: 27 U/L (ref 4–60)
LYMPHOCYTES # BLD AUTO: 1.7 K/UL (ref 1–4.8)
LYMPHOCYTES NFR BLD: 12.7 % (ref 18–48)
MCH RBC QN AUTO: 31.5 PG (ref 27–31)
MCHC RBC AUTO-ENTMCNC: 36.3 G/DL (ref 32–36)
MCV RBC AUTO: 87 FL (ref 82–98)
METHADONE UR QL SCN>300 NG/ML: NEGATIVE
MICROSCOPIC COMMENT: NORMAL
MONOCYTES # BLD AUTO: 0.5 K/UL (ref 0.3–1)
MONOCYTES NFR BLD: 4 % (ref 4–15)
NEUTROPHILS # BLD AUTO: 10.9 K/UL (ref 1.8–7.7)
NEUTROPHILS NFR BLD: 82.5 % (ref 38–73)
NITRITE UR QL STRIP: NEGATIVE
NRBC BLD-RTO: 0 /100 WBC
OPIATES UR QL SCN: NEGATIVE
PCP UR QL SCN>25 NG/ML: NEGATIVE
PH UR STRIP: 7 [PH] (ref 5–8)
PLATELET # BLD AUTO: 376 K/UL (ref 150–450)
PMV BLD AUTO: 9.9 FL (ref 9.2–12.9)
POTASSIUM SERPL-SCNC: 3.8 MMOL/L (ref 3.5–5.1)
PROT SERPL-MCNC: 8.4 G/DL (ref 6–8.4)
PROT UR QL STRIP: ABNORMAL
RBC # BLD AUTO: 5.84 M/UL (ref 4.6–6.2)
RBC #/AREA URNS HPF: 2 /HPF (ref 0–4)
SODIUM SERPL-SCNC: 142 MMOL/L (ref 136–145)
SP GR UR STRIP: >1.03 (ref 1–1.03)
TOXICOLOGY INFORMATION: NORMAL
URN SPEC COLLECT METH UR: ABNORMAL
UROBILINOGEN UR STRIP-ACNC: NEGATIVE EU/DL
WBC # BLD AUTO: 13.15 K/UL (ref 3.9–12.7)
WBC #/AREA URNS HPF: 1 /HPF (ref 0–5)

## 2024-10-30 PROCEDURE — 96372 THER/PROPH/DIAG INJ SC/IM: CPT | Performed by: STUDENT IN AN ORGANIZED HEALTH CARE EDUCATION/TRAINING PROGRAM

## 2024-10-30 PROCEDURE — 25000003 PHARM REV CODE 250: Performed by: STUDENT IN AN ORGANIZED HEALTH CARE EDUCATION/TRAINING PROGRAM

## 2024-10-30 PROCEDURE — 25500020 PHARM REV CODE 255: Performed by: STUDENT IN AN ORGANIZED HEALTH CARE EDUCATION/TRAINING PROGRAM

## 2024-10-30 PROCEDURE — 96375 TX/PRO/DX INJ NEW DRUG ADDON: CPT

## 2024-10-30 PROCEDURE — 81000 URINALYSIS NONAUTO W/SCOPE: CPT | Mod: 59 | Performed by: STUDENT IN AN ORGANIZED HEALTH CARE EDUCATION/TRAINING PROGRAM

## 2024-10-30 PROCEDURE — 96361 HYDRATE IV INFUSION ADD-ON: CPT

## 2024-10-30 PROCEDURE — 80307 DRUG TEST PRSMV CHEM ANLYZR: CPT | Performed by: STUDENT IN AN ORGANIZED HEALTH CARE EDUCATION/TRAINING PROGRAM

## 2024-10-30 PROCEDURE — 63600175 PHARM REV CODE 636 W HCPCS: Performed by: STUDENT IN AN ORGANIZED HEALTH CARE EDUCATION/TRAINING PROGRAM

## 2024-10-30 RX ORDER — SUCRALFATE 1 G/1
1 TABLET ORAL 2 TIMES DAILY
Qty: 20 TABLET | Refills: 0 | Status: SHIPPED | OUTPATIENT
Start: 2024-10-30 | End: 2024-11-09

## 2024-10-30 RX ORDER — METOCLOPRAMIDE HYDROCHLORIDE 5 MG/ML
10 INJECTION INTRAMUSCULAR; INTRAVENOUS
Status: COMPLETED | OUTPATIENT
Start: 2024-10-30 | End: 2024-10-30

## 2024-10-30 RX ORDER — DIPHENHYDRAMINE HYDROCHLORIDE 50 MG/ML
25 INJECTION INTRAMUSCULAR; INTRAVENOUS
Status: COMPLETED | OUTPATIENT
Start: 2024-10-30 | End: 2024-10-30

## 2024-10-30 RX ORDER — FENTANYL CITRATE 50 UG/ML
50 INJECTION, SOLUTION INTRAMUSCULAR; INTRAVENOUS
Status: DISCONTINUED | OUTPATIENT
Start: 2024-10-30 | End: 2024-10-30

## 2024-10-30 RX ORDER — HALOPERIDOL 5 MG/ML
5 INJECTION INTRAMUSCULAR
Status: COMPLETED | OUTPATIENT
Start: 2024-10-30 | End: 2024-10-30

## 2024-10-30 RX ORDER — SUCRALFATE 1 G/10ML
1 SUSPENSION ORAL
Status: DISCONTINUED | OUTPATIENT
Start: 2024-10-30 | End: 2024-10-30

## 2024-10-30 RX ORDER — CALCIUM CARBONATE 200(500)MG
1000 TABLET,CHEWABLE ORAL
Status: DISCONTINUED | OUTPATIENT
Start: 2024-10-30 | End: 2024-10-30

## 2024-10-30 RX ORDER — PANTOPRAZOLE SODIUM 40 MG/1
40 TABLET, DELAYED RELEASE ORAL DAILY
Qty: 30 TABLET | Refills: 0 | Status: SHIPPED | OUTPATIENT
Start: 2024-10-30 | End: 2024-11-29

## 2024-10-30 RX ORDER — SODIUM CHLORIDE 9 MG/ML
1000 INJECTION, SOLUTION INTRAVENOUS
Status: COMPLETED | OUTPATIENT
Start: 2024-10-30 | End: 2024-10-30

## 2024-10-30 RX ADMIN — METOCLOPRAMIDE 10 MG: 5 INJECTION, SOLUTION INTRAMUSCULAR; INTRAVENOUS at 12:10

## 2024-10-30 RX ADMIN — DIPHENHYDRAMINE HYDROCHLORIDE 25 MG: 50 INJECTION INTRAMUSCULAR; INTRAVENOUS at 12:10

## 2024-10-30 RX ADMIN — SODIUM CHLORIDE 1000 ML: 9 INJECTION, SOLUTION INTRAVENOUS at 01:10

## 2024-10-30 RX ADMIN — HALOPERIDOL LACTATE 5 MG: 5 INJECTION, SOLUTION INTRAMUSCULAR at 01:10

## 2024-10-30 RX ADMIN — IOHEXOL 100 ML: 350 INJECTION, SOLUTION INTRAVENOUS at 02:10

## 2024-10-30 NOTE — ED NOTES
Tristanradhakellie Iglesia Nunez, a 29 y.o. male presents to the ED w/ complaint of Abdominal pain 10/10 and vomiting. Patient noted to have dry heaving at this time. Explained to patient need for IV and lab and expressed understanding. Asked for urine specimen and reports unable to provide at this time.    Triage note:  Chief Complaint   Patient presents with    Abdominal Pain     C/o abodominal pain and emesis since yesterday, up to and including hematemesis.      Review of patient's allergies indicates:   Allergen Reactions    Opioids - morphine analogues Palpitations     History reviewed. No pertinent past medical history.

## 2024-10-30 NOTE — ED PROVIDER NOTES
Encounter Date: 10/29/2024       History     Chief Complaint   Patient presents with    Abdominal Pain     C/o abodominal pain and emesis since yesterday, up to and including hematemesis.      HPI  29-year-old male with history of esophagitis on endoscopy, presents brought in by self through triage for upper abdominal pain and vomiting.  Pain is located in the epigastrium, associated with several episodes of nonbilious non coffee ground emesis, states that they were some that had red in it.  Denies any other systemic or infectious symptoms or other acute complaints.  Review of patient's allergies indicates:   Allergen Reactions    Opioids - morphine analogues Palpitations     History reviewed. No pertinent past medical history.  Past Surgical History:   Procedure Laterality Date    ESOPHAGOGASTRODUODENOSCOPY N/A 9/23/2024    Procedure: EGD (ESOPHAGOGASTRODUODENOSCOPY);  Surgeon: Garret Randle MD;  Location: [facility];  Service: Endoscopy;  Laterality: N/A;     No family history on file.  Social History     Tobacco Use    Smoking status: Unknown   Medical surgical family and social history reviewed  Review of Systems  Complete review of systems was conducted and was negative except as per HPI and as marked  Physical Exam     Initial Vitals   BP Pulse Resp Temp SpO2   10/29/24 2310 10/29/24 2310 10/29/24 2310 10/29/24 2315 10/29/24 2310   (!) 163/87 110 (!) 22 98.6 °F (37 °C) 100 %      MAP       --                Physical Exam  Physical  General: Awake, alert, scream-vomiting  Head: Normocephalic, atraumatic  Neck: Trachea midline, full range of motion, no meningismus  ENT: Atraumatic, Airway Patent slightly dry mucous membranes  Cardio:  Borderline tachycardia Heart Sounds Normal, Capillary refill normal  Chest: Atraumatic, No respiratory distress no use of accessory muscles to breath, normal rate, sounds even and clear to auscultation  Abdomen: Soft, isolated epigastric tenderness without peritonitis no  involuntary guarding, rigidity, or rebound.  Upper Ext: Atraumatic, Inspection normal, no swelling  Lower Ext: Atraumatic, Inspection normal, no swelling  Neuro: No gross cranial nerve abnormality, no lateralization, no gross sensory or motor deficits  ED Course   Procedures  Labs Reviewed   CBC W/ AUTO DIFFERENTIAL - Abnormal       Result Value    WBC 13.15 (*)     RBC 5.84      Hemoglobin 18.4 (*)     Hematocrit 50.7      MCV 87      MCH 31.5 (*)     MCHC 36.3 (*)     RDW 11.7      Platelets 376      MPV 9.9      Immature Granulocytes 0.4      Gran # (ANC) 10.9 (*)     Immature Grans (Abs) 0.05 (*)     Lymph # 1.7      Mono # 0.5      Eos # 0.0      Baso # 0.05      nRBC 0      Gran % 82.5 (*)     Lymph % 12.7 (*)     Mono % 4.0      Eosinophil % 0.0      Basophil % 0.4      Differential Method Automated     COMPREHENSIVE METABOLIC PANEL - Abnormal    Sodium 142      Potassium 3.8      Chloride 106      CO2 18 (*)     Glucose 131 (*)     BUN 15      Creatinine 1.1      Calcium 10.6 (*)     Total Protein 8.4      Albumin 5.0      Total Bilirubin 0.9      Alkaline Phosphatase 95      AST 19      ALT 43      eGFR >60      Anion Gap 18 (*)    URINALYSIS, REFLEX TO URINE CULTURE - Abnormal    Specimen UA Urine, Clean Catch      Color, UA Yellow      Appearance, UA Clear      pH, UA 7.0      Specific Gravity, UA >1.030 (*)     Protein, UA 1+ (*)     Glucose, UA Negative      Ketones, UA 3+ (*)     Bilirubin (UA) Negative      Occult Blood UA Negative      Nitrite, UA Negative      Urobilinogen, UA Negative      Leukocytes, UA Negative      Narrative:     Specimen Source->Urine   LIPASE    Lipase 27     C-REACTIVE PROTEIN    CRP 0.6     DRUG SCREEN PANEL, URINE EMERGENCY    Benzodiazepines Negative      Methadone metabolites Negative      Cocaine (Metab.) Negative      Opiate Scrn, Ur Negative      Barbiturate Screen, Ur Negative      Amphetamine Screen, Ur Negative      THC Negative      Phencyclidine Negative       Creatinine, Urine 330.0      Toxicology Information SEE COMMENT      Narrative:     Specimen Source->Urine   URINALYSIS MICROSCOPIC    RBC, UA 2      WBC, UA 1      Bacteria None      Hyaline Casts, UA 0      Microscopic Comment SEE COMMENT      Narrative:     Specimen Source->Urine          Imaging Results              CT Abdomen Pelvis With IV Contrast NO Oral Contrast (Final result)  Result time 10/30/24 02:43:47      Final result by David Gil MD (10/30/24 02:43:47)                   Impression:      No acute findings.      Electronically signed by: David Gil MD  Date:    10/30/2024  Time:    02:43               Narrative:    EXAMINATION:  CT ABDOMEN PELVIS WITH IV CONTRAST    CLINICAL HISTORY:  Abdominal pain, acute, nonlocalized;    TECHNIQUE:  Low dose axial images, sagittal and coronal reformations were obtained from the lung bases to the pubic symphysis following the IV administration of 75 mL of Omnipaque 350 .  Oral contrast was not administered.    COMPARISON:  09/21/2024.    FINDINGS:  Abdomen:    - Lower thorax:Unremarkable.    - Lung bases: No infiltrates and no nodules.    - Liver: No focal mass.    - Gallbladder: No calcified gallstones.    - Bile Ducts: No evidence of intra or extra hepatic biliary ductal dilation.    - Spleen: Negative.    - Kidneys: No mass or hydronephrosis.    - Adrenals: Unremarkable.    - Pancreas: No mass or peripancreatic fat stranding.    - Retroperitoneum:  No significant adenopathy.    - Vascular: No abdominal aortic aneurysm.    - Abdominal wall:  Small fat containing umbilical hernia.    Pelvis:    No pelvic mass, adenopathy, or free fluid.    Bowel/Mesentery:    No evidence of bowel obstruction or inflammation.  Normal appendix.    Bones:  No acute osseous abnormality and no suspicious lytic or blastic lesion.                                       X-Ray Chest AP Portable (Final result)  Result time 10/30/24 01:19:18      Final result by David Gil  MD JM (10/30/24 01:19:18)                   Impression:      No acute findings in the chest.      Electronically signed by: David Gil MD  Date:    10/30/2024  Time:    01:19               Narrative:    EXAMINATION:  XR CHEST AP PORTABLE    CLINICAL HISTORY:  Hematemesis    TECHNIQUE:  Single frontal view of the chest was performed.    COMPARISON:  09/21/2024.    FINDINGS:  No consolidation, pleural effusion or pneumothorax.    Cardiomediastinal silhouette is unremarkable.                                       Medications   ondansetron injection 8 mg (8 mg Intravenous Given 10/29/24 2352)   famotidine (PF) injection 40 mg (40 mg Intravenous Given 10/29/24 2351)   metoclopramide injection 10 mg (10 mg Intravenous Given 10/30/24 0030)   diphenhydrAMINE injection 25 mg (25 mg Intravenous Given 10/30/24 0030)   0.9%  NaCl infusion (0 mLs Intravenous Stopped 10/30/24 0203)   haloperidol lactate injection 5 mg (5 mg Intramuscular Given 10/30/24 0102)   iohexoL (OMNIPAQUE 350) injection 75 mL (100 mLs Intravenous Given 10/30/24 0223)     Medical Decision Making              29-year-old male presented for abdominal pain and vomiting, known history of gastritis/esophagitis based on endoscopy done recently.  He is hemodynamically stable but in obvious discomfort.  Will evaluate for acute pathology requiring intervention with appropriate studies, treat symptomatically, and reassess.    Multimodal therapy and escalating doses was provided, for which he still remained intolerant.  After Haldol he dramatically improved.  Due to the degree to which he was symptomatic as well as laboratory abnormalities CT was done, this was reviewed and negative.  After all this he was able to tolerate p.o..  He has not had any bloody emesis during his entire time in the ED, I suspect this is a transient Lisa-Haeny tear in the setting of repeated vomiting.    All studies reviewed, negative for acute pathology requiring intervention.  Diagnosis, use of prescription medications, need for follow-up regarding visit today, need to call for follow-up, expected course, and return precautions were all discussed at length in my traditional manner to which patient verbalized understanding and agrees and all questions were answered. Patient is well appearing and ambulatory at time of discharge.                      Clinical Impression:  Final diagnoses:  [K92.0] Hematemesis  [R10.9] Abdominal pain, unspecified abdominal location (Primary)  [K22.6] Lisa-Haney tear          ED Disposition Condition    Discharge Stable          ED Prescriptions       Medication Sig Dispense Start Date End Date Auth. Provider    pantoprazole (PROTONIX) 40 MG tablet Take 1 tablet (40 mg total) by mouth once daily. 30 tablet 10/30/2024 11/29/2024 Kane Manzo MD    sucralfate (CARAFATE) 1 gram tablet Take 1 tablet (1 g total) by mouth 2 (two) times daily. for 10 days 20 tablet 10/30/2024 11/9/2024 Kane Manzo MD          Follow-up Information    None          Kane Manzo MD  10/30/24 3047

## 2024-10-30 NOTE — DISCHARGE INSTRUCTIONS
Kwame un seguimiento con [facility] gastroenter[facility]logo llamando para programar amish wyatt.  Use los medicamentos según lo recetado y regrese a la adilson de emergencias si la condición empeora de alguna manera.  Recomiendo evitar los alimentos grasosos, los alimentos fritos, las comidas picantes, el alcohol, el tabaco y la cafeína.

## 2024-10-30 NOTE — ED NOTES
Asked if able to produce urine specimen, Reports unable to produce yet. Bolus still infusing and urinal within reach of hand. Notified patient and family to call staff for help. Verbalized understanding.

## 2024-10-31 ENCOUNTER — TELEPHONE (OUTPATIENT)
Dept: GASTROENTEROLOGY | Facility: CLINIC | Age: 29
End: 2024-10-31

## 2024-10-31 ENCOUNTER — HOSPITAL ENCOUNTER (EMERGENCY)
Facility: HOSPITAL | Age: 29
Discharge: HOME | End: 2024-10-31
Attending: EMERGENCY MEDICINE

## 2024-10-31 VITALS
BODY MASS INDEX: 23.34 KG/M2 | WEIGHT: 154 LBS | HEART RATE: 99 BPM | OXYGEN SATURATION: 95 % | DIASTOLIC BLOOD PRESSURE: 83 MMHG | RESPIRATION RATE: 20 BRPM | SYSTOLIC BLOOD PRESSURE: 128 MMHG | TEMPERATURE: 98 F | HEIGHT: 68 IN

## 2024-10-31 DIAGNOSIS — R10.13 EPIGASTRIC PAIN: ICD-10-CM

## 2024-10-31 DIAGNOSIS — R11.2 NAUSEA AND VOMITING, UNSPECIFIED VOMITING TYPE: Primary | ICD-10-CM

## 2024-10-31 LAB
ALBUMIN SERPL BCP-MCNC: 4.7 G/DL (ref 3.5–5.2)
ALP SERPL-CCNC: 86 U/L (ref 40–150)
ALT SERPL W/O P-5'-P-CCNC: 30 U/L (ref 10–44)
ANION GAP SERPL CALC-SCNC: 12 MMOL/L (ref 8–16)
AST SERPL-CCNC: 20 U/L (ref 10–40)
BACTERIA #/AREA URNS HPF: NORMAL /HPF
BASOPHILS # BLD AUTO: 0.04 K/UL (ref 0–0.2)
BASOPHILS NFR BLD: 0.4 % (ref 0–1.9)
BILIRUB SERPL-MCNC: 1.1 MG/DL (ref 0.1–1)
BILIRUB UR QL STRIP: NEGATIVE
BUN SERPL-MCNC: 15 MG/DL (ref 6–20)
CALCIUM SERPL-MCNC: 10.2 MG/DL (ref 8.7–10.5)
CHLORIDE SERPL-SCNC: 103 MMOL/L (ref 95–110)
CLARITY UR: CLEAR
CO2 SERPL-SCNC: 22 MMOL/L (ref 23–29)
COLOR UR: YELLOW
CREAT SERPL-MCNC: 1 MG/DL (ref 0.5–1.4)
DIFFERENTIAL METHOD BLD: ABNORMAL
EOSINOPHIL # BLD AUTO: 0 K/UL (ref 0–0.5)
EOSINOPHIL NFR BLD: 0 % (ref 0–8)
ERYTHROCYTE [DISTWIDTH] IN BLOOD BY AUTOMATED COUNT: 11.5 % (ref 11.5–14.5)
EST. GFR  (NO RACE VARIABLE): >60 ML/MIN/1.73 M^2
GLUCOSE SERPL-MCNC: 124 MG/DL (ref 70–110)
GLUCOSE UR QL STRIP: NEGATIVE
HCT VFR BLD AUTO: 47.8 % (ref 40–54)
HGB BLD-MCNC: 17.4 G/DL (ref 14–18)
HGB UR QL STRIP: NEGATIVE
HYALINE CASTS #/AREA URNS LPF: 0 /LPF
IMM GRANULOCYTES # BLD AUTO: 0.02 K/UL (ref 0–0.04)
IMM GRANULOCYTES NFR BLD AUTO: 0.2 % (ref 0–0.5)
KETONES UR QL STRIP: ABNORMAL
LACTATE SERPL-SCNC: 1.3 MMOL/L (ref 0.5–2.2)
LEUKOCYTE ESTERASE UR QL STRIP: NEGATIVE
LIPASE SERPL-CCNC: 20 U/L (ref 4–60)
LYMPHOCYTES # BLD AUTO: 1.2 K/UL (ref 1–4.8)
LYMPHOCYTES NFR BLD: 11 % (ref 18–48)
MCH RBC QN AUTO: 31.5 PG (ref 27–31)
MCHC RBC AUTO-ENTMCNC: 36.4 G/DL (ref 32–36)
MCV RBC AUTO: 87 FL (ref 82–98)
MICROSCOPIC COMMENT: NORMAL
MONOCYTES # BLD AUTO: 0.4 K/UL (ref 0.3–1)
MONOCYTES NFR BLD: 3.6 % (ref 4–15)
NEUTROPHILS # BLD AUTO: 9 K/UL (ref 1.8–7.7)
NEUTROPHILS NFR BLD: 84.8 % (ref 38–73)
NITRITE UR QL STRIP: NEGATIVE
NRBC BLD-RTO: 0 /100 WBC
OHS QRS DURATION: 90 MS
OHS QTC CALCULATION: 393 MS
PH UR STRIP: 7 [PH] (ref 5–8)
PLATELET # BLD AUTO: 353 K/UL (ref 150–450)
PMV BLD AUTO: 10.1 FL (ref 9.2–12.9)
POTASSIUM SERPL-SCNC: 3.7 MMOL/L (ref 3.5–5.1)
PROT SERPL-MCNC: 8 G/DL (ref 6–8.4)
PROT UR QL STRIP: ABNORMAL
RBC # BLD AUTO: 5.52 M/UL (ref 4.6–6.2)
RBC #/AREA URNS HPF: 0 /HPF (ref 0–4)
SODIUM SERPL-SCNC: 137 MMOL/L (ref 136–145)
SP GR UR STRIP: >1.03 (ref 1–1.03)
TROPONIN I SERPL DL<=0.01 NG/ML-MCNC: <0.006 NG/ML (ref 0–0.03)
URN SPEC COLLECT METH UR: ABNORMAL
UROBILINOGEN UR STRIP-ACNC: >=8 EU/DL
WBC # BLD AUTO: 10.62 K/UL (ref 3.9–12.7)
WBC #/AREA URNS HPF: 1 /HPF (ref 0–5)

## 2024-10-31 PROCEDURE — 80053 COMPREHEN METABOLIC PANEL: CPT

## 2024-10-31 PROCEDURE — 93005 ELECTROCARDIOGRAM TRACING: CPT

## 2024-10-31 PROCEDURE — 96375 TX/PRO/DX INJ NEW DRUG ADDON: CPT

## 2024-10-31 PROCEDURE — 81000 URINALYSIS NONAUTO W/SCOPE: CPT

## 2024-10-31 PROCEDURE — 93010 ELECTROCARDIOGRAM REPORT: CPT | Mod: ,,, | Performed by: INTERNAL MEDICINE

## 2024-10-31 PROCEDURE — 85025 COMPLETE CBC W/AUTO DIFF WBC: CPT

## 2024-10-31 PROCEDURE — 84484 ASSAY OF TROPONIN QUANT: CPT

## 2024-10-31 PROCEDURE — 96374 THER/PROPH/DIAG INJ IV PUSH: CPT

## 2024-10-31 PROCEDURE — 83690 ASSAY OF LIPASE: CPT

## 2024-10-31 PROCEDURE — 63600175 PHARM REV CODE 636 W HCPCS

## 2024-10-31 PROCEDURE — 83605 ASSAY OF LACTIC ACID: CPT | Performed by: EMERGENCY MEDICINE

## 2024-10-31 PROCEDURE — 99284 EMERGENCY DEPT VISIT MOD MDM: CPT | Mod: 25

## 2024-10-31 PROCEDURE — 63600175 PHARM REV CODE 636 W HCPCS: Performed by: EMERGENCY MEDICINE

## 2024-10-31 RX ORDER — DROPERIDOL 2.5 MG/ML
1.25 INJECTION, SOLUTION INTRAMUSCULAR; INTRAVENOUS
Status: COMPLETED | OUTPATIENT
Start: 2024-10-31 | End: 2024-10-31

## 2024-10-31 RX ORDER — ONDANSETRON HYDROCHLORIDE 2 MG/ML
4 INJECTION, SOLUTION INTRAVENOUS
Status: COMPLETED | OUTPATIENT
Start: 2024-10-31 | End: 2024-10-31

## 2024-10-31 RX ADMIN — DROPERIDOL 1.25 MG: 2.5 INJECTION, SOLUTION INTRAMUSCULAR; INTRAVENOUS at 03:10

## 2024-10-31 RX ADMIN — ONDANSETRON 4 MG: 2 INJECTION INTRAMUSCULAR; INTRAVENOUS at 02:10

## 2024-10-31 NOTE — ED NOTES
Due to language barrier, an  was present during the discussion with this patient. Virtual  ID [id number]. Patient requesting to leave. Patient advised no discharge has been written at this time. Patient states is still wanting to leave.

## 2024-10-31 NOTE — ED PROVIDER NOTES
Emergency Department Encounter  Provider Note    Oxana Nunez  [id number]  10/31/2024    Evaluation:    History Acquisition:     Chief Complaint   Patient presents with    Abdominal Pain     Patient reports to the ED complaining of epigastric abdominal pain, nausea, and emesis ongoing for a few days. Patient seen here previously. Actively vomiting in triage.       History of Present Illness:  Oxana Nunez is a 29 y.o. male who has no past medical history on file.    The patient presents to the ED due to abdominal pain, N/V.   Patient states symptoms started several days ago.  He has been vomiting constantly at home.  He reports some blood mixed with his emesis over the last few days.  He is on omeprazole for acid reflux.  He reports subjective fever but did not take his temperature.  He takes no other medications.  Denies any other sick contacts.     #[id number] used for professional medical interpretation.       Additional historians utilized:  none    Prior medical records were reviewed:   Visit 10/29 for abdominal pain, N/V. Required Zofran, Reglan, Benadryl, Haldol. CT negative.   EGD 9/23 revealed esophagitis. RX Protonix x 8 weeks.   Admitted 9/21-9/24 for abdominal pain, vomiting.     The patient's list of active medical history, family/social history, medications, and allergies as documented has been reviewed.     History reviewed. No pertinent past medical history.  Past Surgical History:   Procedure Laterality Date    ESOPHAGOGASTRODUODENOSCOPY N/A 9/23/2024    Procedure: EGD (ESOPHAGOGASTRODUODENOSCOPY);  Surgeon: Garret Randle MD;  Location: [facility];  Service: Endoscopy;  Laterality: N/A;     No family history on file.  Social History     Socioeconomic History    Marital status: Single   Tobacco Use    Smoking status: Unknown     Social Drivers of Health     Financial Resource Strain: Low Risk  (9/24/2024)    Overall Financial Resource Strain (CARDIA)     Difficulty  of Paying Living Expenses: Not hard at all   Food Insecurity: No Food Insecurity (9/24/2024)    Hunger Vital Sign     Worried About Running Out of Food in the Last Year: Never true     Ran Out of Food in the Last Year: Never true   Transportation Needs: No Transportation Needs (9/24/2024)    TRANSPORTATION NEEDS     Transportation : No   Physical Activity: Inactive (9/24/2024)    Exercise Vital Sign     Days of Exercise per Week: 0 days     Minutes of Exercise per Session: 0 min   Stress: No Stress Concern Present (9/24/2024)    [employer] - Occupational Stress Questionnaire     Feeling of Stress : Not at all   Housing Stability: Low Risk  (9/24/2024)    Housing Stability Vital Sign     Unable to Pay for Housing in the Last Year: No     Homeless in the Last Year: No       Medications:  Medication List with Changes/Refills   Current Medications    HYOSCYAMINE 0.125 MG SUBL    Place 1 tablet (0.125 mg total) under the tongue every 6 (six) hours as needed (abd pain).    ONDANSETRON (ZOFRAN-ODT) 4 MG TBDL    Take 2 tablets (8 mg total) by mouth every 6 (six) hours as needed (Nausea/Vomiting).    PANTOPRAZOLE (PROTONIX) 40 MG TABLET    Take 1 tablet (40 mg total) by mouth 2 (two) times daily.    PANTOPRAZOLE (PROTONIX) 40 MG TABLET    Take 1 tablet (40 mg total) by mouth once daily.    SUCRALFATE (CARAFATE) 1 GRAM TABLET    Take 1 tablet (1 g total) by mouth 2 (two) times daily. for 10 days       Allergies:  Review of patient's allergies indicates:   Allergen Reactions    Opioids - morphine analogues Palpitations         Physical Exam:     Initial Vitals [10/31/24 1346]   BP Pulse Resp Temp SpO2   (!) 145/88 79 (!) 22 98.4 °F (36.9 °C) 99 %      MAP       --         Physical Exam    Nursing note and vitals reviewed.  Constitutional: He appears well-developed and well-nourished. He is not diaphoretic. No distress.   HENT:   Head: Normocephalic and atraumatic. Mouth/Throat: Oropharynx is clear  and moist.   Eyes: EOM are normal. Pupils are equal, round, and reactive to light.   Neck: No tracheal deviation present.   Cardiovascular:  Normal rate, regular rhythm, normal heart sounds and intact distal pulses.           Pulmonary/Chest: Breath sounds normal. No stridor. No respiratory distress.   Abdominal: Abdomen is soft. He exhibits no distension and no mass. There is abdominal tenderness in the epigastric area and periumbilical area. There is no rebound, no guarding, no tenderness at McBurney's point and negative Montiel's sign.   Musculoskeletal:         General: No edema. Normal range of motion.     Neurological: He is alert and oriented to person, place, and time. No cranial nerve deficit or sensory deficit.   Skin: Skin is warm and dry. Capillary refill takes less than 2 seconds. No rash noted.   Psychiatric: He has a normal mood and affect. His behavior is normal. Thought content normal.         Differential Diagnoses:   Based on available information and initial assessment, Differential Diagnosis includes, but is not limited to:  Bowel obstruction, incarcerated/strangulated hernia, ileus, appendicitis, cholecystitis, aspirated foreign body, esophageal food impaction, biliary colic, colitis/diverticulitis, gastroenteritis, esophagitis, hepatitis, pancreatitis, GERD, PUD, constipation, nephrolithiasis, UTI/pyelonephritis.      ED Management:   Procedures    Orders Placed This Encounter    CBC W/ AUTO DIFFERENTIAL    Comp. Metabolic Panel    Lipase    Urinalysis, Reflex to Urine Culture Urine, Clean Catch    Urinalysis Microscopic    Lactic acid, plasma    Troponin I    Troponin I    Diet NPO    Vital signs    Nursing communication    EKG 12-lead    Insert peripheral IV    ondansetron injection 4 mg    droPERidol injection 1.25 mg          EKG:   EKG interpretation by ED attending physician:  NSR, rate 75, non-specific ST changes, no ST elevations or acute ischemia, normal intervals.  No prior for  comparison.       Labs:     Labs Reviewed   CBC W/ AUTO DIFFERENTIAL - Abnormal       Result Value    WBC 10.62      RBC 5.52      Hemoglobin 17.4      Hematocrit 47.8      MCV 87      MCH 31.5 (*)     MCHC 36.4 (*)     RDW 11.5      Platelets 353      MPV 10.1      Immature Granulocytes 0.2      Gran # (ANC) 9.0 (*)     Immature Grans (Abs) 0.02      Lymph # 1.2      Mono # 0.4      Eos # 0.0      Baso # 0.04      nRBC 0      Gran % 84.8 (*)     Lymph % 11.0 (*)     Mono % 3.6 (*)     Eosinophil % 0.0      Basophil % 0.4      Differential Method Automated     COMPREHENSIVE METABOLIC PANEL - Abnormal    Sodium 137      Potassium 3.7      Chloride 103      CO2 22 (*)     Glucose 124 (*)     BUN 15      Creatinine 1.0      Calcium 10.2      Total Protein 8.0      Albumin 4.7      Total Bilirubin 1.1 (*)     Alkaline Phosphatase 86      AST 20      ALT 30      eGFR >60      Anion Gap 12     URINALYSIS, REFLEX TO URINE CULTURE - Abnormal    Specimen UA Urine, Clean Catch      Color, UA Yellow      Appearance, UA Clear      pH, UA 7.0      Specific Gravity, UA >1.030 (*)     Protein, UA 1+ (*)     Glucose, UA Negative      Ketones, UA 3+ (*)     Bilirubin (UA) Negative      Occult Blood UA Negative      Nitrite, UA Negative      Urobilinogen, UA >=8.0 (*)     Leukocytes, UA Negative      Narrative:     Specimen Source->Urine   LIPASE    Lipase 20     URINALYSIS MICROSCOPIC    RBC, UA 0      WBC, UA 1      Bacteria None      Hyaline Casts, UA 0      Microscopic Comment SEE COMMENT      Narrative:     Specimen Source->Urine   LACTIC ACID, PLASMA    Lactate (Lactic Acid) 1.3     TROPONIN I   TROPONIN I    Troponin I <0.006       Independent review of the labs ordered include:   See ED course    Imaging:     Imaging Results    None            Medications Given:     Medications   ondansetron injection 4 mg (4 mg Intravenous Given 10/31/24 1415)   droPERidol injection 1.25 mg (1.25 mg Intravenous Given 10/31/24 1528)         Medical Decision Making:    Additional Consideration:   Additional testing considered during clinical course: CT A/P considered but not indicated given reassuring vitals and exam, labs benign, unlikely acute surgical process    Social determinants of health considered during development of treatment plan include: poor access to care, language barrier    Current co-morbidities considered which impacted clinical decision making: GERD    Case discussed with additional provider: none    ED Course as of 10/31/24 1721   Thu Oct 31, 2024   1358 SpO2: 99 % [SS]   1358 Resp(!): 22 [SS]   1358 Pulse: 79 [SS]   1358 Temp: 98.4 °F (36.9 °C) [SS]   1358 BP(!): 145/88  Mild tachypnea, otherwise reassuring  [SS]   1441 Comp. Metabolic Panel(!)  Unremarkable, bilirubin very slightly elevated but not consistent with obstructive process [SS]   1441 Lipase  Normal [SS]   1442 CBC W/ AUTO DIFFERENTIAL(!)  WBC normal, improved from prior visit [SS]   1503 Urinalysis, Reflex to Urine Culture Urine, Clean Catch(!) [SS]   1503 Urinalysis Microscopic  UA without infection  [SS]   1528 Lactic acid, plasma  Lactate normal  [SS]   1720 Troponin I  Normal  [SS]   1720 On attempt to re-evaluate the patient, he was found to have eloped from the emergency department. [SS]      ED Course User Index  [SS] Manjit Roman MD            Medical Decision Making  Problems Addressed:  Epigastric pain: acute illness or injury  Nausea and vomiting, unspecified vomiting type: acute illness or injury    Amount and/or Complexity of Data Reviewed  External Data Reviewed: notes.  Labs: ordered. Decision-making details documented in ED Course.  ECG/medicine tests: ordered and independent interpretation performed. Decision-making details documented in ED Course.    Risk  Prescription drug management.  Parenteral controlled substances.  Diagnosis or treatment significantly limited by social determinants of health.        Clinical Impression:       ICD-10-CM  ICD-9-CM   1. Nausea and vomiting, unspecified vomiting type  R11.2 787.01   2. Epigastric pain  R10.13 789.06         Follow-up Information    None          ED Disposition Condition    Eloped               Patient eloped from ED.      Manjit Roman MD  10/31/24 3656

## 2024-11-05 ENCOUNTER — TELEPHONE (OUTPATIENT)
Dept: GASTROENTEROLOGY | Facility: CLINIC | Age: 29
End: 2024-11-05

## 2024-11-05 NOTE — TELEPHONE ENCOUNTER
Left VM for pt to call the office back to discuss test results.     ----- Message from Garret Randle MD sent at 10/2/2024 11:23 AM CDT -----  Pathologic findings for recent EGD reviewed.  No evidence of H pylori infection identified.  Some mild inflammation was noted.  Continue current medications.  Repeat EGD in 8 weeks to document healing

## 2024-11-12 ENCOUNTER — TELEPHONE (OUTPATIENT)
Dept: GASTROENTEROLOGY | Facility: CLINIC | Age: 29
End: 2024-11-12

## 2024-11-12 NOTE — TELEPHONE ENCOUNTER
Left VM for pt to call the office back to discuss test results. Letter sent.     ----- Message from Garret Randle MD sent at 10/2/2024 11:23 AM CDT -----  Pathologic findings for recent EGD reviewed.  No evidence of H pylori infection identified.  Some mild inflammation was noted.  Continue current medications.  Repeat EGD in 8 weeks to document healing

## 2024-11-15 ENCOUNTER — HOSPITAL ENCOUNTER (EMERGENCY)
Facility: HOSPITAL | Age: 29
Discharge: HOME | End: 2024-11-15
Attending: EMERGENCY MEDICINE

## 2024-11-15 VITALS
BODY MASS INDEX: 22.98 KG/M2 | SYSTOLIC BLOOD PRESSURE: 124 MMHG | DIASTOLIC BLOOD PRESSURE: 79 MMHG | WEIGHT: 155.19 LBS | HEIGHT: 69 IN | OXYGEN SATURATION: 100 % | HEART RATE: 79 BPM | RESPIRATION RATE: 17 BRPM | TEMPERATURE: 98 F

## 2024-11-15 DIAGNOSIS — R11.2 NAUSEA AND VOMITING, UNSPECIFIED VOMITING TYPE: Primary | ICD-10-CM

## 2024-11-15 DIAGNOSIS — R10.13 EPIGASTRIC PAIN: ICD-10-CM

## 2024-11-15 LAB
ALBUMIN SERPL BCP-MCNC: 4.3 G/DL (ref 3.5–5.2)
ALP SERPL-CCNC: 76 U/L (ref 40–150)
ALT SERPL W/O P-5'-P-CCNC: 22 U/L (ref 10–44)
ANION GAP SERPL CALC-SCNC: 12 MMOL/L (ref 8–16)
AST SERPL-CCNC: 19 U/L (ref 10–40)
BACTERIA #/AREA URNS HPF: ABNORMAL /HPF
BASOPHILS # BLD AUTO: 0.06 K/UL (ref 0–0.2)
BASOPHILS NFR BLD: 0.6 % (ref 0–1.9)
BILIRUB SERPL-MCNC: 0.8 MG/DL (ref 0.1–1)
BILIRUB UR QL STRIP: NEGATIVE
BUN SERPL-MCNC: 14 MG/DL (ref 6–20)
CALCIUM SERPL-MCNC: 9.9 MG/DL (ref 8.7–10.5)
CHLORIDE SERPL-SCNC: 104 MMOL/L (ref 95–110)
CLARITY UR: CLEAR
CO2 SERPL-SCNC: 21 MMOL/L (ref 23–29)
COLOR UR: YELLOW
CREAT SERPL-MCNC: 0.9 MG/DL (ref 0.5–1.4)
DIFFERENTIAL METHOD BLD: ABNORMAL
EOSINOPHIL # BLD AUTO: 0 K/UL (ref 0–0.5)
EOSINOPHIL NFR BLD: 0.3 % (ref 0–8)
ERYTHROCYTE [DISTWIDTH] IN BLOOD BY AUTOMATED COUNT: 11.3 % (ref 11.5–14.5)
EST. GFR  (NO RACE VARIABLE): >60 ML/MIN/1.73 M^2
GLUCOSE SERPL-MCNC: 102 MG/DL (ref 70–110)
GLUCOSE UR QL STRIP: NEGATIVE
HCT VFR BLD AUTO: 48.9 % (ref 40–54)
HGB BLD-MCNC: 17.5 G/DL (ref 14–18)
HGB UR QL STRIP: NEGATIVE
HYALINE CASTS #/AREA URNS LPF: 0 /LPF
IMM GRANULOCYTES # BLD AUTO: 0.04 K/UL (ref 0–0.04)
IMM GRANULOCYTES NFR BLD AUTO: 0.4 % (ref 0–0.5)
KETONES UR QL STRIP: ABNORMAL
LEUKOCYTE ESTERASE UR QL STRIP: NEGATIVE
LIPASE SERPL-CCNC: 30 U/L (ref 4–60)
LYMPHOCYTES # BLD AUTO: 1.4 K/UL (ref 1–4.8)
LYMPHOCYTES NFR BLD: 14.1 % (ref 18–48)
MAGNESIUM SERPL-MCNC: 2.2 MG/DL (ref 1.6–2.6)
MCH RBC QN AUTO: 31 PG (ref 27–31)
MCHC RBC AUTO-ENTMCNC: 35.8 G/DL (ref 32–36)
MCV RBC AUTO: 87 FL (ref 82–98)
MICROSCOPIC COMMENT: ABNORMAL
MONOCYTES # BLD AUTO: 0.6 K/UL (ref 0.3–1)
MONOCYTES NFR BLD: 6 % (ref 4–15)
NEUTROPHILS # BLD AUTO: 7.7 K/UL (ref 1.8–7.7)
NEUTROPHILS NFR BLD: 78.6 % (ref 38–73)
NITRITE UR QL STRIP: NEGATIVE
NRBC BLD-RTO: 0 /100 WBC
PH UR STRIP: 8 [PH] (ref 5–8)
PLATELET # BLD AUTO: 324 K/UL (ref 150–450)
PMV BLD AUTO: 9.8 FL (ref 9.2–12.9)
POTASSIUM SERPL-SCNC: 4.2 MMOL/L (ref 3.5–5.1)
PROT SERPL-MCNC: 7.9 G/DL (ref 6–8.4)
PROT UR QL STRIP: ABNORMAL
RBC # BLD AUTO: 5.65 M/UL (ref 4.6–6.2)
RBC #/AREA URNS HPF: 8 /HPF (ref 0–4)
SODIUM SERPL-SCNC: 137 MMOL/L (ref 136–145)
SP GR UR STRIP: >1.03 (ref 1–1.03)
URN SPEC COLLECT METH UR: ABNORMAL
UROBILINOGEN UR STRIP-ACNC: ABNORMAL EU/DL
WBC # BLD AUTO: 9.79 K/UL (ref 3.9–12.7)
WBC #/AREA URNS HPF: 1 /HPF (ref 0–5)

## 2024-11-15 PROCEDURE — 25000003 PHARM REV CODE 250: Performed by: PHYSICIAN ASSISTANT

## 2024-11-15 PROCEDURE — 85025 COMPLETE CBC W/AUTO DIFF WBC: CPT | Performed by: PHYSICIAN ASSISTANT

## 2024-11-15 PROCEDURE — 96365 THER/PROPH/DIAG IV INF INIT: CPT

## 2024-11-15 PROCEDURE — 80053 COMPREHEN METABOLIC PANEL: CPT | Performed by: PHYSICIAN ASSISTANT

## 2024-11-15 PROCEDURE — 99284 EMERGENCY DEPT VISIT MOD MDM: CPT | Mod: 25

## 2024-11-15 PROCEDURE — 63600175 PHARM REV CODE 636 W HCPCS: Performed by: PHYSICIAN ASSISTANT

## 2024-11-15 PROCEDURE — 96375 TX/PRO/DX INJ NEW DRUG ADDON: CPT

## 2024-11-15 PROCEDURE — 83690 ASSAY OF LIPASE: CPT | Performed by: PHYSICIAN ASSISTANT

## 2024-11-15 PROCEDURE — 83735 ASSAY OF MAGNESIUM: CPT | Performed by: PHYSICIAN ASSISTANT

## 2024-11-15 PROCEDURE — 81000 URINALYSIS NONAUTO W/SCOPE: CPT | Performed by: PHYSICIAN ASSISTANT

## 2024-11-15 RX ORDER — ONDANSETRON HYDROCHLORIDE 2 MG/ML
8 INJECTION, SOLUTION INTRAVENOUS
Status: COMPLETED | OUTPATIENT
Start: 2024-11-15 | End: 2024-11-15

## 2024-11-15 RX ORDER — ONDANSETRON 4 MG/1
4 TABLET, ORALLY DISINTEGRATING ORAL EVERY 6 HOURS PRN
Qty: 20 TABLET | Refills: 0 | Status: SHIPPED | OUTPATIENT
Start: 2024-11-15

## 2024-11-15 RX ORDER — PANTOPRAZOLE SODIUM 40 MG/1
40 TABLET, DELAYED RELEASE ORAL 2 TIMES DAILY
Qty: 60 TABLET | Refills: 0 | Status: SHIPPED | OUTPATIENT
Start: 2024-11-15 | End: 2025-11-15

## 2024-11-15 RX ADMIN — PROMETHAZINE HYDROCHLORIDE 25 MG: 25 INJECTION INTRAMUSCULAR; INTRAVENOUS at 04:11

## 2024-11-15 RX ADMIN — ONDANSETRON 8 MG: 2 INJECTION INTRAMUSCULAR; INTRAVENOUS at 12:11

## 2024-11-15 NOTE — DISCHARGE INSTRUCTIONS

## 2024-11-15 NOTE — ED PROVIDER NOTES
"Encounter Date: 11/15/2024       History     Chief Complaint   Patient presents with    Abdominal Pain     Epigastric abdominal pain with nausea and vomiting x 4 days. Pt states he is vomiting blood. Able to tolerate small amount of water.     29-year-old male with significant history of gastritis/esophagitis presents to ED with concern of 3-4 day onset epigastric pain, nausea and vomiting with limited ability to tolerate p.o..  He reports vomit will intermittently have a "brown" appearance resembling coffee-ground emesis.  Denying significant bright red blood.  Denying bright red blood or melena in stool.  Abdominal pain specific to epigastric region, nonradiating with current severity 8/10.  No fevers, chills, chest pain, cough, shortness of breath.  He has not taken any medications for his symptoms.  Denying alcohol or drug use.  No other acute complaints at this time    The history is provided by the patient.     Review of patient's allergies indicates:   Allergen Reactions    Opioids - morphine analogues Palpitations     History reviewed. No pertinent past medical history.  Past Surgical History:   Procedure Laterality Date    ESOPHAGOGASTRODUODENOSCOPY N/A 9/23/2024    Procedure: EGD (ESOPHAGOGASTRODUODENOSCOPY);  Surgeon: Garret Randle MD;  Location: [facility];  Service: Endoscopy;  Laterality: N/A;     No family history on file.  Social History     Tobacco Use    Smoking status: Unknown     Review of Systems   Constitutional:  Negative for chills and fever.   HENT:  Negative for ear pain.    Respiratory:  Negative for cough and shortness of breath.    Cardiovascular:  Negative for chest pain.   Gastrointestinal:  Positive for abdominal pain, nausea and vomiting. Negative for diarrhea.   Musculoskeletal:  Negative for myalgias, neck pain and neck stiffness.   Neurological:  Negative for headaches.       Physical Exam     Initial Vitals [11/15/24 1128]   BP Pulse Resp Temp SpO2   121/82 93 18 98.1 " °F (36.7 °C) 97 %      MAP       --         Physical Exam    Vitals reviewed.  Constitutional: Vital signs are normal. He appears well-developed and well-nourished. He is cooperative. He does not have a sickly appearance. He does not appear ill. No distress.   HENT:   Head: Normocephalic and atraumatic.   Eyes: EOM are normal.   Neck:   Normal range of motion.  Cardiovascular:  Normal rate and regular rhythm.           Pulmonary/Chest: Effort normal and breath sounds normal.   Abdominal: There is abdominal tenderness in the epigastric area.   Epigastric abdominal tenderness on exam.  No guarding.  No distention.   Musculoskeletal:      Cervical back: Normal range of motion.     Neurological: He is alert and oriented to person, place, and time. GCS eye subscore is 4. GCS verbal subscore is 5. GCS motor subscore is 6.   Psychiatric: He has a normal mood and affect. His speech is normal and behavior is normal.         ED Course   Procedures  Labs Reviewed   CBC W/ AUTO DIFFERENTIAL - Abnormal       Result Value    WBC 9.79      RBC 5.65      Hemoglobin 17.5      Hematocrit 48.9      MCV 87      MCH 31.0      MCHC 35.8      RDW 11.3 (*)     Platelets 324      MPV 9.8      Immature Granulocytes 0.4      Gran # (ANC) 7.7      Immature Grans (Abs) 0.04      Lymph # 1.4      Mono # 0.6      Eos # 0.0      Baso # 0.06      nRBC 0      Gran % 78.6 (*)     Lymph % 14.1 (*)     Mono % 6.0      Eosinophil % 0.3      Basophil % 0.6      Differential Method Automated     COMPREHENSIVE METABOLIC PANEL - Abnormal    Sodium 137      Potassium 4.2      Chloride 104      CO2 21 (*)     Glucose 102      BUN 14      Creatinine 0.9      Calcium 9.9      Total Protein 7.9      Albumin 4.3      Total Bilirubin 0.8      Alkaline Phosphatase 76      AST 19      ALT 22      eGFR >60      Anion Gap 12     LIPASE    Lipase 30     MAGNESIUM    Magnesium 2.2     URINALYSIS, REFLEX TO URINE CULTURE          Imaging Results              X-Ray Chest  1 View (Final result)  Result time 11/15/24 14:22:59      Final result by Gabe Bertrand DO (11/15/24 14:22:59)                   Impression:      Please see above      Electronically signed by: Gabe Bertrand DO  Date:    11/15/2024  Time:    14:22               Narrative:    EXAMINATION:  XR CHEST 1 VIEW    CLINICAL HISTORY:  Epigastric pain    TECHNIQUE:  Single frontal view of the chest was performed.    COMPARISON:  10/30/2024    FINDINGS:  No new lung opacity.  No lung consolidation.  No large pleural effusion pneumothorax.  Allowing for portable technique cardiomediastinal silhouette within normal limits.  Visualized osseous structures grossly intact.  Further evaluation as warranted.                                       Medications   ondansetron injection 8 mg (8 mg Intravenous Given 11/15/24 1205)   promethazine (PHENERGAN) 25 mg in 0.9% NaCl 50 mL IVPB (0 mg Intravenous Stopped 11/15/24 1633)     Medical Decision Making  Patient presents with concern of epigastric abdominal pain, nausea, vomiting and diarrhea.  He does report intermittent blood in vomit described as coffee-ground emesis.  Known history of esophagitis/gastritis.  Afebrile with vitals WNL.  Patient in no distress on exam    DDx:  Including but not limited to gastroenteritis, gastritis, esophagitis, upper GI bleed, anemia, viral, URI, allergy/irritant    Amount and/or Complexity of Data Reviewed  Labs: ordered. Decision-making details documented in ED Course.  Radiology: ordered.    Risk  Prescription drug management.               ED Course as of 11/15/24 1645   Fri Nov 15, 2024   1213 CBC auto differential(!)  Stable H&H [KS]   1452 Comprehensive metabolic panel(!)  Grossly unremarkable.  BUN 14. [KS]   1452 Lipase  WNL [KS]   1452 Magnesium  WNL [KS]   1453 X-Ray Chest 1 View  No acute findings per Radiology review [KS]   1523 Patient continued to have nausea with 1 episode of vomiting in ED. vomit at bedside does not appear to have  bright red blood or coffee-ground appearance to it.  Will give additional antiemetic and continue to monitor [KS]   1639 Vomiting controlled.  Patient states he is feeling better and wishes to return home.  Given refill of his home Protonix and Zofran.  Patient encouraged to slowly advance diet as tolerated with very close follow-up with his gastroenterologist.  ED return precautions were discussed.  Patient states understanding and agrees with plan [KS]   1644 Patient discussed with attending, Dr. Ferguson, who agrees with ED course and dispo. [KS]      ED Course User Index  [KS] Min Padgett PA-C                           Clinical Impression:  Final diagnoses:  [R10.13] Epigastric pain  [R11.2] Nausea and vomiting, unspecified vomiting type (Primary)          ED Disposition Condition    Discharge Stable          ED Prescriptions       Medication Sig Dispense Start Date End Date Auth. Provider    ondansetron (ZOFRAN-ODT) 4 MG TbDL Take 1 tablet (4 mg total) by mouth every 6 (six) hours as needed (nausea). 20 tablet 11/15/2024 -- Min Padgett PA-C    pantoprazole (PROTONIX) 40 MG tablet Take 1 tablet (40 mg total) by mouth 2 (two) times daily. 60 tablet 11/15/2024 11/15/2025 Min Padgett PA-C          Follow-up Information       Follow up With Specialties Details Why Contact Info    Your Doctor        Garret Randle MD Gastroenterology   [address] [place] [address]  [address]  [place] [id number] [phone]               Min Padgett PA-C  11/15/24 0894